# Patient Record
Sex: FEMALE | Race: WHITE | NOT HISPANIC OR LATINO | Employment: FULL TIME | URBAN - METROPOLITAN AREA
[De-identification: names, ages, dates, MRNs, and addresses within clinical notes are randomized per-mention and may not be internally consistent; named-entity substitution may affect disease eponyms.]

---

## 2017-01-03 ENCOUNTER — OFFICE VISIT (OUTPATIENT)
Dept: INTERNAL MEDICINE | Facility: CLINIC | Age: 52
End: 2017-01-03

## 2017-01-03 VITALS
TEMPERATURE: 98 F | OXYGEN SATURATION: 98 % | SYSTOLIC BLOOD PRESSURE: 122 MMHG | BODY MASS INDEX: 21 KG/M2 | WEIGHT: 130.7 LBS | HEIGHT: 66 IN | DIASTOLIC BLOOD PRESSURE: 60 MMHG | HEART RATE: 77 BPM

## 2017-01-03 DIAGNOSIS — E03.9 ACQUIRED HYPOTHYROIDISM: Primary | ICD-10-CM

## 2017-01-03 LAB
CHOLEST SERPL-MCNC: 220 MG/DL (ref 0–200)
HDLC SERPL-MCNC: 63 MG/DL (ref 40–60)
LDLC SERPL CALC-MCNC: 142 MG/DL (ref 0–100)
TRIGL SERPL-MCNC: 74 MG/DL (ref 0–150)
TSH SERPL DL<=0.005 MIU/L-ACNC: 0.63 MIU/ML (ref 0.27–4.2)
VLDLC SERPL CALC-MCNC: 14.8 MG/DL (ref 5–40)

## 2017-01-03 PROCEDURE — 99213 OFFICE O/P EST LOW 20 MIN: CPT | Performed by: FAMILY MEDICINE

## 2017-01-03 RX ORDER — LEVOTHYROXINE SODIUM 112 UG/1
112 TABLET ORAL DAILY
Qty: 30 TABLET | Refills: 11 | Status: SHIPPED | OUTPATIENT
Start: 2017-01-03 | End: 2017-10-12 | Stop reason: SDUPTHER

## 2017-01-03 RX ORDER — NAPROXEN SODIUM 220 MG
220 TABLET ORAL DAILY
COMMUNITY
End: 2019-02-06

## 2017-01-03 NOTE — MR AVS SNAPSHOT
Isacc LITO Aldridge   1/3/2017 8:30 AM   Office Visit    Dept Phone:  [phone]   Encounter #:  [id number]    Provider:  Theo Gong MD   Department:  [facility]                Your Full Care Plan              Today's Medication Changes          These changes are accurate as of: 1/3/17  8:46 AM.  If you have any questions, ask your nurse or doctor.               Stop taking medication(s)listed here:     ciprofloxacin 500 MG tablet   Commonly known as:  CIPRO   Stopped by:  Theo Gong MD           ibuprofen 200 MG tablet   Commonly known as:  ADVIL,MOTRIN   Stopped by:  Theo Gong MD                Where to Get Your Medications      These medications were sent to [address] - [address] [place] [address] AT  [address] - [phone]  - [phone] [place] [address]     Phone:  [phone]     levothyroxine 112 MCG tablet                  Your Updated Medication List          This list is accurate as of: 1/3/17  8:46 AM.  Always use your most recent med list.                levothyroxine 112 MCG tablet   Commonly known as:  SYNTHROID, LEVOTHROID   Take 1 tablet by mouth Daily.       naproxen sodium 220 MG tablet   Commonly known as:  ALEVE               We Performed the Following     Lipid Panel     TSH       You Were Diagnosed With        Codes Comments    Acquired hypothyroidism    -  Primary ICD-10-CM: E03.9  ICD-9-CM: 244.9       Instructions     None    Patient Instructions History      Upcoming Appointments     Visit Type Date Time Department    OFFICE VISIT 1/3/2017  8:30 AM [facility]      [employer] Signup     [facility] [employer] allows you to send messages to your doctor, view your test results, renew your prescriptions, schedule appointments, and more. To sign up, go to [employer] and click on the Sign Up Now link in the New User? box. Enter your  "[email or web address] Activation Code exactly as it appears below along with the last four digits of your Social Security Number and your Date of Birth () to complete the sign-up process. If you do not sign up before the expiration date, you must request a new code.    [email or web address] Activation Code: [email or web address]  Expires: 2017  8:46 AM    If you have questions, you can email [email or web address] or call [phone] to talk to our [email or web address] staff. Remember, [email or web address] is NOT to be used for urgent needs. For medical emergencies, dial 911.               Other Info from Your Visit           Allergies     No Known Allergies      Reason for Visit     follow up to hypothyroidism           Vital Signs     Blood Pressure Pulse Temperature Height Weight Oxygen Saturation    122/60 (BP Location: Left arm, Patient Position: Sitting, Cuff Size: Adult) 77 98 °F (36.7 °C) (Oral) 66\" (167.6 cm) 130 lb 11.2 oz (59.3 kg) 98%    Breastfeeding? Body Mass Index Smoking Status             No 21.1 kg/m2 Current Every Day Smoker         Problems and Diagnoses Noted     Acquired underactive thyroid        "

## 2017-01-03 NOTE — PROGRESS NOTES
Subjective   Isacc Aldridge is a 51 y.o. female.     Chief Complaint   Patient presents with   • follow up to hypothyroidism         History of Present Illness patient comes in for follow-up of hypothyroidism she's been under some stress this past year with death of her mother in [place] who had heroin overdose she tried to take some time off work but she cannot be home alone and so she went back to work and is gradually getting back into a daily routine no chest pain no shortness of breath no rapid heart rate or weight loss    The following portions of the patient's history were reviewed and updated as appropriate: allergies, current medications, past family history, past medical history, past social history, past surgical history and problem list.    Review of Systems   Constitutional: Negative.    Respiratory: Negative.    Cardiovascular: Negative.    Neurological: Negative.    Psychiatric/Behavioral: Negative.        Objective   Physical Exam   Constitutional: She appears well-developed and well-nourished.   Eyes: EOM are normal.   Neck: Normal range of motion. Neck supple. No thyromegaly present.   Cardiovascular: Normal rate and regular rhythm.    Pulmonary/Chest: Effort normal and breath sounds normal.   Neurological: She is alert.   Psychiatric: She has a normal mood and affect. Her behavior is normal. Judgment and thought content normal.   Nursing note and vitals reviewed.      Assessment/Plan   Isacc was seen today for follow up to hypothyroidism.    Diagnoses and all orders for this visit:    Acquired hypothyroidism  -     TSH  -     Lipid Panel    Other orders  -     levothyroxine (SYNTHROID, LEVOTHROID) 112 MCG tablet; Take 1 tablet by mouth Daily.      Follow-up results of blood work and as needed otherwise in 9 months

## 2017-01-05 ENCOUNTER — TELEPHONE (OUTPATIENT)
Dept: INTERNAL MEDICINE | Facility: CLINIC | Age: 52
End: 2017-01-05

## 2017-01-05 NOTE — TELEPHONE ENCOUNTER
----- Message from Theo Gong MD sent at 1/4/2017 12:50 PM EST -----  Labs good continue same dose of thyroid medicine recheck in 9 months

## 2017-10-10 ENCOUNTER — OFFICE VISIT (OUTPATIENT)
Dept: FAMILY MEDICINE CLINIC | Facility: CLINIC | Age: 52
End: 2017-10-10

## 2017-10-10 VITALS
DIASTOLIC BLOOD PRESSURE: 64 MMHG | OXYGEN SATURATION: 97 % | WEIGHT: 131.2 LBS | HEART RATE: 72 BPM | SYSTOLIC BLOOD PRESSURE: 106 MMHG | TEMPERATURE: 98 F | HEIGHT: 66 IN | BODY MASS INDEX: 21.08 KG/M2

## 2017-10-10 DIAGNOSIS — R31.9 HEMATURIA, UNSPECIFIED TYPE: ICD-10-CM

## 2017-10-10 DIAGNOSIS — E89.0 POSTABLATIVE HYPOTHYROIDISM: ICD-10-CM

## 2017-10-10 DIAGNOSIS — R59.0 CERVICAL LYMPHADENOPATHY: ICD-10-CM

## 2017-10-10 DIAGNOSIS — E03.9 ACQUIRED HYPOTHYROIDISM: Primary | ICD-10-CM

## 2017-10-10 LAB
BILIRUB BLD-MCNC: NEGATIVE MG/DL
CLARITY, POC: CLEAR
COLOR UR: YELLOW
GLUCOSE UR STRIP-MCNC: NEGATIVE MG/DL
KETONES UR QL: NEGATIVE
LEUKOCYTE EST, POC: NEGATIVE
NITRITE UR-MCNC: NEGATIVE MG/ML
PH UR: 6 [PH] (ref 5–8)
PROT UR STRIP-MCNC: NEGATIVE MG/DL
RBC # UR STRIP: ABNORMAL /UL
SP GR UR: 1.01 (ref 1–1.03)
UROBILINOGEN UR QL: NORMAL

## 2017-10-10 PROCEDURE — 99203 OFFICE O/P NEW LOW 30 MIN: CPT | Performed by: PHYSICIAN ASSISTANT

## 2017-10-10 RX ORDER — IBUPROFEN 200 MG
200 TABLET ORAL EVERY 6 HOURS PRN
COMMUNITY

## 2017-10-11 LAB
25(OH)D3+25(OH)D2 SERPL-MCNC: 30.6 NG/ML (ref 30–100)
ALBUMIN SERPL-MCNC: 4.8 G/DL (ref 3.5–5.2)
ALBUMIN/GLOB SERPL: 2 G/DL
ALP SERPL-CCNC: 65 U/L (ref 39–117)
ALT SERPL-CCNC: 13 U/L (ref 1–33)
AST SERPL-CCNC: 16 U/L (ref 1–32)
BASOPHILS # BLD AUTO: 0.02 10*3/MM3 (ref 0–0.2)
BASOPHILS NFR BLD AUTO: 0.4 % (ref 0–1.5)
BILIRUB SERPL-MCNC: 0.6 MG/DL (ref 0.1–1.2)
BUN SERPL-MCNC: 14 MG/DL (ref 6–20)
BUN/CREAT SERPL: 15.9 (ref 7–25)
CALCIUM SERPL-MCNC: 9.8 MG/DL (ref 8.6–10.5)
CHLORIDE SERPL-SCNC: 104 MMOL/L (ref 98–107)
CHOLEST SERPL-MCNC: 201 MG/DL (ref 0–200)
CO2 SERPL-SCNC: 25.5 MMOL/L (ref 22–29)
CREAT SERPL-MCNC: 0.88 MG/DL (ref 0.57–1)
EOSINOPHIL # BLD AUTO: 0.06 10*3/MM3 (ref 0–0.7)
EOSINOPHIL NFR BLD AUTO: 1.2 % (ref 0.3–6.2)
ERYTHROCYTE [DISTWIDTH] IN BLOOD BY AUTOMATED COUNT: 12.9 % (ref 11.7–13)
FT4I SERPL CALC-MCNC: 3.6 (ref 1.2–4.9)
GLOBULIN SER CALC-MCNC: 2.4 GM/DL
GLUCOSE SERPL-MCNC: 93 MG/DL (ref 65–99)
HCT VFR BLD AUTO: 43.2 % (ref 35.6–45.5)
HDLC SERPL-MCNC: 64 MG/DL (ref 40–60)
HGB BLD-MCNC: 14.9 G/DL (ref 11.9–15.5)
IMM GRANULOCYTES # BLD: 0 10*3/MM3 (ref 0–0.03)
IMM GRANULOCYTES NFR BLD: 0 % (ref 0–0.5)
LDLC SERPL CALC-MCNC: 124 MG/DL (ref 0–100)
LDLC/HDLC SERPL: 1.94 {RATIO}
LYMPHOCYTES # BLD AUTO: 1.46 10*3/MM3 (ref 0.9–4.8)
LYMPHOCYTES NFR BLD AUTO: 28.4 % (ref 19.6–45.3)
MCH RBC QN AUTO: 32.7 PG (ref 26.9–32)
MCHC RBC AUTO-ENTMCNC: 34.5 G/DL (ref 32.4–36.3)
MCV RBC AUTO: 94.9 FL (ref 80.5–98.2)
MONOCYTES # BLD AUTO: 0.32 10*3/MM3 (ref 0.2–1.2)
MONOCYTES NFR BLD AUTO: 6.2 % (ref 5–12)
NEUTROPHILS # BLD AUTO: 3.28 10*3/MM3 (ref 1.9–8.1)
NEUTROPHILS NFR BLD AUTO: 63.8 % (ref 42.7–76)
PLATELET # BLD AUTO: 136 10*3/MM3 (ref 140–500)
POTASSIUM SERPL-SCNC: 4.5 MMOL/L (ref 3.5–5.2)
PROT SERPL-MCNC: 7.2 G/DL (ref 6–8.5)
RBC # BLD AUTO: 4.55 10*6/MM3 (ref 3.9–5.2)
SODIUM SERPL-SCNC: 144 MMOL/L (ref 136–145)
T3RU NFR SERPL: 33 % (ref 24–39)
T4 SERPL-MCNC: 11 UG/DL (ref 4.5–12)
TRIGL SERPL-MCNC: 64 MG/DL (ref 0–150)
TSH SERPL DL<=0.005 MIU/L-ACNC: 0.56 UIU/ML (ref 0.45–4.5)
VLDLC SERPL CALC-MCNC: 12.8 MG/DL (ref 5–40)
WBC # BLD AUTO: 5.14 10*3/MM3 (ref 4.5–10.7)

## 2017-10-12 DIAGNOSIS — E89.0 POSTABLATIVE HYPOTHYROIDISM: Primary | ICD-10-CM

## 2017-10-12 LAB
APPEARANCE UR: CLEAR
BACTERIA #/AREA URNS HPF: ABNORMAL /HPF
BACTERIA UR CULT: NORMAL
BACTERIA UR CULT: NORMAL
BILIRUB UR QL STRIP: NEGATIVE
CASTS URNS MICRO: ABNORMAL
COLOR UR: YELLOW
EPI CELLS #/AREA URNS HPF: ABNORMAL /HPF
GLUCOSE UR QL: NEGATIVE
HGB UR QL STRIP: NEGATIVE
KETONES UR QL STRIP: NEGATIVE
LEUKOCYTE ESTERASE UR QL STRIP: NEGATIVE
NITRITE UR QL STRIP: NEGATIVE
PH UR STRIP: 6.5 [PH] (ref 5–8)
PROT UR QL STRIP: NEGATIVE
RBC #/AREA URNS HPF: ABNORMAL /HPF
SP GR UR: 1.02 (ref 1–1.03)
UROBILINOGEN UR STRIP-MCNC: (no result) MG/DL
WBC #/AREA URNS HPF: ABNORMAL /HPF

## 2017-10-12 RX ORDER — LEVOTHYROXINE SODIUM 0.1 MG/1
100 TABLET ORAL DAILY
Qty: 30 TABLET | Refills: 1 | Status: SHIPPED | OUTPATIENT
Start: 2017-10-12 | End: 2018-03-09 | Stop reason: SDUPTHER

## 2017-10-13 NOTE — PATIENT INSTRUCTIONS
52 YEAR OLD FEMALE WHO PRESENTS TODAY AS A NEW PATIENT. SHE HAS PMH SIGNIFICANT FOR HYPOTHYROIDISM THAT SOUNDS POST ABLATIVE- SHE REPORTS DOING SWALLOWS THEN THYROID BECAME UNDERACTIVE- HISTORY IS SLIGHTLY UNCLEAR. PATIENT DOES HAVE ANXIETY AND SOME PARANOIA BUT SHE ALSO HAS STRESS WITH SON THAT IS USING DRUGS THAT INCREASES ANXIETY DUE TO WITNESSING OVERDOSE. SHE DECLINES MEDICATION FOR THIS AT THIS TIME. I WILL CHECK LABS TODAY, INCLUDING THYROID. CALL IF NO RESULTS IN 1 WEEK. SHE ALSO REPORTS SWELLING UNDER THE ANGLE OF THE MANDIBLE ON THE RIGHT THAT WAS THERE SUDDENLY AND PAINLESS THEN RESOLVED SPONTANEOUSLY AFTER 5 DAYS. ON EXAM, SHE DOES HAVE RIGHT TONSILLAR LAD THAT APPEAR FIXED. I WILL CHECK US SOFT TISSUE NECK TO EVALUATE, ESPECIALLY WITH SMOKING HISTORY. SHE HAS HISTORY OF NIGHT SWEATS X 3 YEARS AND IS POSTMENOPAUSAL.     PATIENT IS OVERDUE FOR CPE WITH PAP. SHE DECLINES COLONOSCOPY BUT IS WILLING TO DO COLOGARD. SHE IS UP TO DATE WITH TDAP AND DECLINES FLU SHOT. I ASKED THAT SHE FOLLOW UP FOR CPE WITH PAP, EKG, PFT, AND REFERRAL FOR MAMMOGRAM, DEXA, AND COLOGARD. PATIENT VERBALIZED UNDERSTANDING AND AGREEMENT WITH PLAN.

## 2017-10-26 ENCOUNTER — HOSPITAL ENCOUNTER (OUTPATIENT)
Dept: ULTRASOUND IMAGING | Facility: HOSPITAL | Age: 52
Discharge: HOME | End: 2017-10-26
Admitting: PHYSICIAN ASSISTANT

## 2017-10-26 PROCEDURE — 76536 US EXAM OF HEAD AND NECK: CPT

## 2017-11-06 DIAGNOSIS — D35.1 PARATHYROID ADENOMA: Primary | ICD-10-CM

## 2017-11-07 ENCOUNTER — TELEPHONE (OUTPATIENT)
Dept: FAMILY MEDICINE CLINIC | Facility: CLINIC | Age: 52
End: 2017-11-07

## 2017-11-07 DIAGNOSIS — R31.9 HEMATURIA, UNSPECIFIED TYPE: Primary | ICD-10-CM

## 2017-11-07 NOTE — TELEPHONE ENCOUNTER
Patient states she never heard anything regarding the referral to Urology for blood in her urine, I do not see a referral in her chart

## 2018-03-09 DIAGNOSIS — E89.0 POSTABLATIVE HYPOTHYROIDISM: ICD-10-CM

## 2018-03-09 RX ORDER — LEVOTHYROXINE SODIUM 0.1 MG/1
TABLET ORAL
Qty: 30 TABLET | Refills: 0 | Status: SHIPPED | OUTPATIENT
Start: 2018-03-09 | End: 2018-05-01 | Stop reason: SDUPTHER

## 2018-05-01 DIAGNOSIS — E89.0 POSTABLATIVE HYPOTHYROIDISM: ICD-10-CM

## 2018-05-01 RX ORDER — LEVOTHYROXINE SODIUM 0.1 MG/1
TABLET ORAL
Qty: 30 TABLET | Refills: 0 | Status: SHIPPED | OUTPATIENT
Start: 2018-05-01 | End: 2018-05-16 | Stop reason: SDUPTHER

## 2018-05-01 NOTE — TELEPHONE ENCOUNTER
PATIENT IS OVERDUE FOR FOLLOW UP. WAS TO SCHEDULE FOLLOW UP FOR CPE AND NO SHOW APPT. THEN WAS TO FOLLOW UP IN 3-4 MONTHS FROM 10/2017- I HAVE NOT SEEN IN FOLLOW UP. OK TO REFILL FOR 1 MONTH ONCE SHE MAKES APPT FOR FOLLOW UP, FASTING.

## 2018-05-01 NOTE — TELEPHONE ENCOUNTER
----- Message from Lauryn Hwang sent at 5/1/2018  9:09 AM EDT -----  Regarding: medication thyroid  Patient has called her pharmacy for her Thyroid meds.  But she had problem getting last time and was only wanting to avoid that this time.

## 2018-05-02 ENCOUNTER — OFFICE VISIT (OUTPATIENT)
Dept: FAMILY MEDICINE CLINIC | Facility: CLINIC | Age: 53
End: 2018-05-02

## 2018-05-02 VITALS
TEMPERATURE: 98.3 F | DIASTOLIC BLOOD PRESSURE: 64 MMHG | OXYGEN SATURATION: 97 % | WEIGHT: 141.2 LBS | HEIGHT: 66 IN | SYSTOLIC BLOOD PRESSURE: 108 MMHG | HEART RATE: 67 BPM | BODY MASS INDEX: 22.69 KG/M2

## 2018-05-02 DIAGNOSIS — R59.0 CERVICAL LYMPHADENOPATHY: ICD-10-CM

## 2018-05-02 DIAGNOSIS — E89.0 POSTABLATIVE HYPOTHYROIDISM: ICD-10-CM

## 2018-05-02 DIAGNOSIS — Z87.42 HISTORY OF ABNORMAL CERVICAL PAP SMEAR: ICD-10-CM

## 2018-05-02 DIAGNOSIS — R31.9 HEMATURIA, UNSPECIFIED TYPE: ICD-10-CM

## 2018-05-02 DIAGNOSIS — Z01.419 WOMEN'S ANNUAL ROUTINE GYNECOLOGICAL EXAMINATION: Primary | ICD-10-CM

## 2018-05-02 DIAGNOSIS — D35.1 PARATHYROID ADENOMA: ICD-10-CM

## 2018-05-02 DIAGNOSIS — Z00.00 ROUTINE MEDICAL EXAM: ICD-10-CM

## 2018-05-02 PROCEDURE — 93000 ELECTROCARDIOGRAM COMPLETE: CPT | Performed by: PHYSICIAN ASSISTANT

## 2018-05-02 PROCEDURE — 99214 OFFICE O/P EST MOD 30 MIN: CPT | Performed by: PHYSICIAN ASSISTANT

## 2018-05-02 NOTE — PROGRESS NOTES
"Subjective   Isacc Aldridge is a 52 y.o. female. Patient seen today for FOLLOW UP OF HYPOTHYROIDISM, PARATHYROID ISSUES, HEMATURIA, CHOLESTEROL.     History of Present Illness     REPORTS SHE WAS SEEN BY ENT AFTER THYROID/PARATHYROID US. STATES SHE WAS ADVISED THEY NEEDED TO BIOPSY. SHE CANCELLED APPT AND DID NOT RESCHEDULE.     PATIENT STATES WAS \"DEALING WITH MY HANDS\"- STATES FINGERS \"GOT MESSED UP AT WORK\"- WENT TO OFFICE IN [place] AND [facility] IN [place]. REPORTS WENT THROUGH WORKMAN'S COMP. THEY ADVISED TO REMOVE BRACE AND RELEASED TO GO BACK TO WORK. STATES SHE WENT BACK AND THEN SHE QUIT HER JOB.     DID NOT GO UROLOGY AS REFERRED. NOT FASTING TODAY. DID NOT RETURN IN October OR November FOR LABS AS DIRECTED.     REPORTS DOES NOT WANT TO DO CPE WITH PAP TODAY.  STATES HAS HAD ABNORMAL PAP SMEARS \"ALL MY LIFE\". STATES HAD TO HAVE BIOPSIES.     The following portions of the patient's history were reviewed and updated as appropriate: allergies, current medications, past family history, past medical history, past social history, past surgical history and problem list.    Review of Systems   All other systems reviewed and are negative.      Objective   Physical Exam   Constitutional: She is oriented to person, place, and time. She appears well-developed and well-nourished.   HENT:   Head: Normocephalic and atraumatic.   Right Ear: External ear normal.   Left Ear: External ear normal.   Nose: Nose normal.   Eyes: Conjunctivae and lids are normal.   Neck: Neck supple. Carotid bruit is not present.   Cardiovascular: Normal rate, regular rhythm, normal heart sounds and intact distal pulses.  Exam reveals no gallop and no friction rub.    No murmur heard.  Pulmonary/Chest: Effort normal and breath sounds normal. No respiratory distress. She has no wheezes. She has no rhonchi. She has no rales.   Musculoskeletal: She exhibits no edema or deformity.   Neurological: She is alert and oriented to person, place, " and time. Gait normal.   Skin: Skin is warm and dry.   Psychiatric: She has a normal mood and affect. Her speech is normal and behavior is normal. Judgment and thought content normal. Cognition and memory are normal.   Nursing note and vitals reviewed.    ECG 12 Lead  Date/Time: 5/2/2018 5:24 PM  Performed by: AFIA VOGT  Authorized by: AFIA VOGT   Comparison: not compared with previous ECG   Previous ECG: no previous ECG available  Rhythm: sinus rhythm  Rate: normal  Conduction: non-specific intraventricular conduction delay  ST Elevation: V2  T Waves: T waves normal  QRS axis: normal  Other findings comments: BORDERLINE R WAVE PROGRESSION.   Clinical impression: non-specific ECG  Comments: INDICATION: ROUTINE TESTING          Assessment/Plan   Isacc was seen today for follow-up.    Diagnoses and all orders for this visit:    Women's annual routine gynecological examination  -     Ambulatory Referral to Gynecology    History of abnormal cervical Pap smear  -     Ambulatory Referral to Gynecology    Postablative hypothyroidism    Hematuria, unspecified type  -     Ambulatory Referral to Urology    Parathyroid adenoma  -     Ambulatory Referral to ENT (Otolaryngology)    Cervical lymphadenopathy  -     Ambulatory Referral to ENT (Otolaryngology)    Other orders  -     ECG 12 Lead      Patient Instructions   52 YEAR OLD FEMALE WHO INITIALLY WAS HERE TODAY FOR CPE WITH PAP, HOWEVER, SHE DID NO WANT TO PROCEED WITH THAT TODAY, REPORTING SHE WASN'T PREPARED FOR THAT TODAY. EKG AND PFT WERE PERFORMED TODAY. EKG BORDERLINE TODAY - NONSPECIFIC CHANGES. PFT WAS POOR EFFORT BUT SEEMS OK. PATIENT IS ALSO DUE FOR FOLLOW UP OF PARATHYROID ADENOMA, HEMATURIA, HYPOTHYROIDISM, AND LIPIDS. SHE WENT TO SEE ENT AND THEY WANTED TO BIOPSY PARATHYROID GLAND, PER PATIENT. SHE DID NTO SCHEDULE AND DID NOT RETURN FOR FOLLOW UP. SHE WILL NEED TO SEE ENT IN FOLLOW UP- I WILL REFER. SHE ALSO DID NOT SEE UROLOGY FOR HEMATURIA. I WILL  REFER AGAIN TODAY. SHE MUST GO TO RULE OUT STONES, MALIGNANCIES, AND OTHER ETIOLOGY OF HEMATURIA. PATIENT REPORTS TODAY THAT SHE HAS HAD ABNORMAL PAP SMEARS ALL HER LIFE AND HAD TO HAVE COLPOSCOPY/ BIOPSIES AT EACH PAP. I ADVISED WE SHOULD HAVE SPECIALIST TO COMPLETE PAP. I WILL REFER TODAY. SHE SHOULD FOLLOW UP HERE ASAP FOR FASTING LABS AND RETURN SCHEDULE BACK HERE FOR CPE.

## 2018-05-07 ENCOUNTER — OFFICE VISIT (OUTPATIENT)
Dept: FAMILY MEDICINE CLINIC | Facility: CLINIC | Age: 53
End: 2018-05-07

## 2018-05-07 VITALS
HEIGHT: 66 IN | WEIGHT: 141 LBS | HEART RATE: 85 BPM | SYSTOLIC BLOOD PRESSURE: 112 MMHG | DIASTOLIC BLOOD PRESSURE: 68 MMHG | BODY MASS INDEX: 22.66 KG/M2 | TEMPERATURE: 98.8 F | OXYGEN SATURATION: 97 %

## 2018-05-07 DIAGNOSIS — Z12.39 BREAST CANCER SCREENING: ICD-10-CM

## 2018-05-07 DIAGNOSIS — Z78.0 POSTMENOPAUSAL: ICD-10-CM

## 2018-05-07 DIAGNOSIS — Z12.11 COLON CANCER SCREENING: ICD-10-CM

## 2018-05-07 DIAGNOSIS — Z00.00 ROUTINE ADULT HEALTH MAINTENANCE: Primary | ICD-10-CM

## 2018-05-07 PROCEDURE — 99396 PREV VISIT EST AGE 40-64: CPT | Performed by: PHYSICIAN ASSISTANT

## 2018-05-07 NOTE — PATIENT INSTRUCTIONS
52 YEAR OLD FEMALE WHO PRESENTS TODAY FOR CPE. CPE COMPLETED TODAY. SHE IS UP TO DATE ON TDAP AND DECLINES PNEUMOCOCCAL VACCINES AND FLU VACCINE. SHE WAS REFERRED TO GYN FOR ANNUAL GYN EXAM AND PAP (AS SHE REPORTS PAP SMEARS HAVE ALWAYS BEEN ABNORMAL AND SHE HAS NEEDED SEVERAL COLPOSCOPIES / BIOPSIES). SHE DECLINES COLONOSCOPY BUT IS WILLING TO HAVE COLOGUARD. I WILL ORDER TODAY. PATIENT NEEDS UPDATED MAMMOGRAM AND DEXA. I WILL ORDER TODAY- SHE PREFERS [place].     PATIENT WAS REFERRED BACK TO ENT AND UROLOGY AT LAST APPT. SHE SHOULD KEEP APPTS WHEN SCHEDULED. SHE HAS CONTINUED HAND PAIN AND INABILITY TO CARRY THINGS OR SQUEEZE WITH HANDS. 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 SHE IS NO LONGER WORKING AT THAT JOB. I DISCUSSED AUTOIMMUNE TESTING VS FOLLOW UP WITH HAND SURGEON TODAY- SHE DECLINES REFERRAL OR TESTING BUT WILL LET ME KNOW IF SHE WOULD LIKE TO PROCEED AT ANY POINT.

## 2018-05-07 NOTE — PATIENT INSTRUCTIONS
52 YEAR OLD FEMALE WHO INITIALLY WAS HERE TODAY FOR CPE WITH PAP, HOWEVER, SHE DID NO WANT TO PROCEED WITH THAT TODAY, REPORTING SHE WASN'T PREPARED FOR THAT TODAY. EKG AND PFT WERE PERFORMED TODAY. EKG BORDERLINE TODAY - NONSPECIFIC CHANGES. PFT WAS POOR EFFORT BUT SEEMS OK. PATIENT IS ALSO DUE FOR FOLLOW UP OF PARATHYROID ADENOMA, HEMATURIA, HYPOTHYROIDISM, AND LIPIDS. SHE WENT TO SEE ENT AND THEY WANTED TO BIOPSY PARATHYROID GLAND, PER PATIENT. SHE DID NTO SCHEDULE AND DID NOT RETURN FOR FOLLOW UP. SHE WILL NEED TO SEE ENT IN FOLLOW UP- I WILL REFER. SHE ALSO DID NOT SEE UROLOGY FOR HEMATURIA. I WILL REFER AGAIN TODAY. SHE MUST GO TO RULE OUT STONES, MALIGNANCIES, AND OTHER ETIOLOGY OF HEMATURIA. PATIENT REPORTS TODAY THAT SHE HAS HAD ABNORMAL PAP SMEARS ALL HER LIFE AND HAD TO HAVE COLPOSCOPY/ BIOPSIES AT EACH PAP. I ADVISED WE SHOULD HAVE SPECIALIST TO COMPLETE PAP. I WILL REFER TODAY. SHE SHOULD FOLLOW UP HERE ASAP FOR FASTING LABS AND RETURN SCHEDULE BACK HERE FOR CPE.

## 2018-05-07 NOTE — PROGRESS NOTES
"Subjective   Isacc Aldridge is a 52 y.o. female. Patient seen today for annual exam    History of Present Illness     LAST COLONOSCOPY- PATIENT REFUSES. IS WILLING TO DO COLOGUARD. NO FHX COLON CANCER.   LAST MAMMOGRAM- WOULD LIKE TO GO TO [facility]- HAS PAPERWORK FROM 2015 BUT THINKS HAD MORE RECENTLY. STATES THINKS HAD IN 2017 BUT REPORTS THEY COULD NOT FIND THE REPORT.   LAST DEXA- HAS NOT HAD.   LAST PAP- UNSURE OF DATE BUT REPORTS HAS BEEN ABNORMAL REPETITIVELY WITH COLPOSCOPY/BIOPSY. REFERRED LAST WEEK TO GYN FOR ANNUAL EXAM. LMP 2 YEARS AGO.  LAST TDAP- 4/1/16  LAST PREVNAR- HAS NOT HAD- REFUSES  LAST PNEUMOVAX- HAS NOT HAD- REFUSES  LAST FLU SHOT- HAS NOT HAD- REFUSES.     SISTER WITH \"FEMALE CANCER\"- NOT SURE WHAT KIND. STATES RADIATION \"MELTED EVERYTHING TOGETHER\". STATES COLONOSCOPY WITH PERFORATION.     The following portions of the patient's history were reviewed and updated as appropriate: allergies, current medications, past family history, past medical history, past social history, past surgical history and problem list.    Review of Systems   Constitutional:        WEIGHT GAIN SINCE HAS NOT BEEN WORKING   Musculoskeletal: Positive for arthralgias.        BILATERAL HAND PAIN   All other systems reviewed and are negative.      Objective   Physical Exam   Constitutional: She is oriented to person, place, and time. She appears well-developed and well-nourished. No distress.   HENT:   Head: Normocephalic and atraumatic.   Right Ear: Hearing, tympanic membrane, external ear and ear canal normal.   Left Ear: Hearing, tympanic membrane, external ear and ear canal normal.   Nose: Nose normal.   Mouth/Throat: Oropharynx is clear and moist.   Eyes: Conjunctivae, EOM and lids are normal. Pupils are equal, round, and reactive to light.   Neck: Neck supple. No JVD present. Carotid bruit is not present. No tracheal deviation present. No thyroid mass and no thyromegaly present.   Cardiovascular: Normal " rate, regular rhythm, normal heart sounds and intact distal pulses.  Exam reveals no gallop and no friction rub.    No murmur heard.  Pulses:       Radial pulses are 2+ on the right side, and 2+ on the left side.        Posterior tibial pulses are 2+ on the right side, and 2+ on the left side.   Pulmonary/Chest: Effort normal and breath sounds normal. No respiratory distress. She has no wheezes. She has no rhonchi. She has no rales.   Abdominal: Soft. Normal aorta and bowel sounds are normal. She exhibits no distension and no abdominal bruit. There is no hepatosplenomegaly. There is no tenderness. There is no rigidity, no rebound and no guarding. No hernia.   Musculoskeletal: Normal range of motion. She exhibits no edema, tenderness or deformity.   Normal strength.   Lymphadenopathy:     She has no cervical adenopathy.   Neurological: She is alert and oriented to person, place, and time. She has normal strength and normal reflexes. She displays normal reflexes. No cranial nerve deficit or sensory deficit. She exhibits normal muscle tone. Coordination and gait normal.   Skin: Skin is warm and dry. No rash noted. She is not diaphoretic. No erythema.   Psychiatric: She has a normal mood and affect. Her speech is normal and behavior is normal. Judgment and thought content normal. Cognition and memory are normal.       Assessment/Plan   Isacc was seen today for annual exam.    Diagnoses and all orders for this visit:    Routine adult health maintenance    Breast cancer screening  -     Mammo Screening Bilateral With CAD    Postmenopausal  -     DEXA Bone Density Axial    Colon cancer screening  -     Cologuard - Stool, Per Rectum      Patient Instructions   52 YEAR OLD FEMALE WHO PRESENTS TODAY FOR CPE. CPE COMPLETED TODAY. SHE IS UP TO DATE ON TDAP AND DECLINES PNEUMOCOCCAL VACCINES AND FLU VACCINE. SHE WAS REFERRED TO GYN FOR ANNUAL GYN EXAM AND PAP (AS SHE REPORTS PAP SMEARS HAVE ALWAYS BEEN ABNORMAL AND SHE HAS  NEEDED SEVERAL COLPOSCOPIES / BIOPSIES). SHE DECLINES COLONOSCOPY BUT IS WILLING TO HAVE COLOGUARD. I WILL ORDER TODAY. PATIENT NEEDS UPDATED MAMMOGRAM AND DEXA. I WILL ORDER TODAY- SHE PREFERS [facility].     PATIENT WAS REFERRED BACK TO ENT AND UROLOGY AT LAST APPT. SHE SHOULD KEEP APPTS WHEN SCHEDULED. SHE HAS CONTINUED HAND PAIN AND INABILITY TO CARRY THINGS OR SQUEEZE WITH HANDS. 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 SHE IS NO LONGER WORKING AT THAT JOB. I DISCUSSED AUTOIMMUNE TESTING VS FOLLOW UP WITH HAND SURGEON TODAY- SHE DECLINES REFERRAL OR TESTING BUT WILL LET ME KNOW IF SHE WOULD LIKE TO PROCEED AT ANY POINT.

## 2018-05-08 LAB
25(OH)D3+25(OH)D2 SERPL-MCNC: 27.2 NG/ML (ref 30–100)
ALBUMIN SERPL-MCNC: 4.6 G/DL (ref 3.5–5.2)
ALBUMIN/GLOB SERPL: 1.8 G/DL
ALP SERPL-CCNC: 65 U/L (ref 39–117)
ALT SERPL-CCNC: 15 U/L (ref 1–33)
AST SERPL-CCNC: 19 U/L (ref 1–32)
BASOPHILS # BLD AUTO: 0.02 10*3/MM3 (ref 0–0.2)
BASOPHILS NFR BLD AUTO: 0.4 % (ref 0–1.5)
BILIRUB SERPL-MCNC: 0.4 MG/DL (ref 0.1–1.2)
BUN SERPL-MCNC: 12 MG/DL (ref 6–20)
BUN/CREAT SERPL: 12.5 (ref 7–25)
CALCIUM SERPL-MCNC: 9.5 MG/DL (ref 8.6–10.5)
CHLORIDE SERPL-SCNC: 103 MMOL/L (ref 98–107)
CHOLEST SERPL-MCNC: 230 MG/DL (ref 0–200)
CK SERPL-CCNC: 62 U/L (ref 20–180)
CO2 SERPL-SCNC: 25.9 MMOL/L (ref 22–29)
CREAT SERPL-MCNC: 0.96 MG/DL (ref 0.57–1)
EOSINOPHIL # BLD AUTO: 0.06 10*3/MM3 (ref 0–0.7)
EOSINOPHIL NFR BLD AUTO: 1.1 % (ref 0.3–6.2)
ERYTHROCYTE [DISTWIDTH] IN BLOOD BY AUTOMATED COUNT: 12.6 % (ref 11.7–13)
FOLATE SERPL-MCNC: 14.7 NG/ML (ref 4.78–24.2)
FT4I SERPL CALC-MCNC: 2.6 (ref 1.2–4.9)
GFR SERPLBLD CREATININE-BSD FMLA CKD-EPI: 62 ML/MIN/1.73
GFR SERPLBLD CREATININE-BSD FMLA CKD-EPI: 75 ML/MIN/1.73
GLOBULIN SER CALC-MCNC: 2.5 GM/DL
GLUCOSE SERPL-MCNC: 109 MG/DL (ref 65–99)
HCT VFR BLD AUTO: 42.6 % (ref 35.6–45.5)
HDLC SERPL-MCNC: 60 MG/DL (ref 40–60)
HGB BLD-MCNC: 14.8 G/DL (ref 11.9–15.5)
IMM GRANULOCYTES # BLD: 0.01 10*3/MM3 (ref 0–0.03)
IMM GRANULOCYTES NFR BLD: 0.2 % (ref 0–0.5)
LDLC SERPL CALC-MCNC: 152 MG/DL (ref 0–100)
LDLC/HDLC SERPL: 2.54 {RATIO}
LYMPHOCYTES # BLD AUTO: 1.64 10*3/MM3 (ref 0.9–4.8)
LYMPHOCYTES NFR BLD AUTO: 29.1 % (ref 19.6–45.3)
MCH RBC QN AUTO: 33.7 PG (ref 26.9–32)
MCHC RBC AUTO-ENTMCNC: 34.7 G/DL (ref 32.4–36.3)
MCV RBC AUTO: 97 FL (ref 80.5–98.2)
MONOCYTES # BLD AUTO: 0.28 10*3/MM3 (ref 0.2–1.2)
MONOCYTES NFR BLD AUTO: 5 % (ref 5–12)
NEUTROPHILS # BLD AUTO: 3.64 10*3/MM3 (ref 1.9–8.1)
NEUTROPHILS NFR BLD AUTO: 64.4 % (ref 42.7–76)
PLATELET # BLD AUTO: 166 10*3/MM3 (ref 140–500)
POTASSIUM SERPL-SCNC: 4.1 MMOL/L (ref 3.5–5.2)
PROT SERPL-MCNC: 7.1 G/DL (ref 6–8.5)
RBC # BLD AUTO: 4.39 10*6/MM3 (ref 3.9–5.2)
SODIUM SERPL-SCNC: 143 MMOL/L (ref 136–145)
T3RU NFR SERPL: 29 % (ref 24–39)
T4 SERPL-MCNC: 8.9 UG/DL (ref 4.5–12)
TRIGL SERPL-MCNC: 89 MG/DL (ref 0–150)
TSH SERPL DL<=0.005 MIU/L-ACNC: 6.29 UIU/ML (ref 0.45–4.5)
VIT B12 SERPL-MCNC: 401 PG/ML (ref 211–946)
VLDLC SERPL CALC-MCNC: 17.8 MG/DL (ref 5–40)
WBC # BLD AUTO: 5.64 10*3/MM3 (ref 4.5–10.7)

## 2018-05-16 DIAGNOSIS — E89.0 POSTABLATIVE HYPOTHYROIDISM: ICD-10-CM

## 2018-05-16 RX ORDER — LEVOTHYROXINE SODIUM 112 UG/1
112 TABLET ORAL DAILY
Qty: 30 TABLET | Refills: 1 | Status: SHIPPED | OUTPATIENT
Start: 2018-05-16 | End: 2018-08-03 | Stop reason: SDUPTHER

## 2018-05-16 RX ORDER — CHOLECALCIFEROL (VITAMIN D3) 125 MCG
2000 CAPSULE ORAL DAILY
Qty: 30 TABLET | Refills: 3 | Status: SHIPPED | OUTPATIENT
Start: 2018-05-16 | End: 2020-09-18

## 2018-08-03 DIAGNOSIS — E89.0 POSTABLATIVE HYPOTHYROIDISM: ICD-10-CM

## 2018-08-03 RX ORDER — LEVOTHYROXINE SODIUM 112 UG/1
TABLET ORAL
Qty: 30 TABLET | Refills: 0 | Status: SHIPPED | OUTPATIENT
Start: 2018-08-03 | End: 2018-09-10 | Stop reason: SDUPTHER

## 2018-09-10 ENCOUNTER — OFFICE VISIT (OUTPATIENT)
Dept: FAMILY MEDICINE CLINIC | Facility: CLINIC | Age: 53
End: 2018-09-10

## 2018-09-10 VITALS
BODY MASS INDEX: 23.24 KG/M2 | TEMPERATURE: 98.2 F | DIASTOLIC BLOOD PRESSURE: 70 MMHG | WEIGHT: 144.6 LBS | HEIGHT: 66 IN | HEART RATE: 81 BPM | SYSTOLIC BLOOD PRESSURE: 106 MMHG | OXYGEN SATURATION: 96 %

## 2018-09-10 DIAGNOSIS — E55.9 VITAMIN D DEFICIENCY: ICD-10-CM

## 2018-09-10 DIAGNOSIS — E89.0 POSTABLATIVE HYPOTHYROIDISM: ICD-10-CM

## 2018-09-10 DIAGNOSIS — E78.49 OTHER HYPERLIPIDEMIA: ICD-10-CM

## 2018-09-10 DIAGNOSIS — R31.9 HEMATURIA, UNSPECIFIED TYPE: ICD-10-CM

## 2018-09-10 DIAGNOSIS — D35.1 PARATHYROID ADENOMA: ICD-10-CM

## 2018-09-10 DIAGNOSIS — E89.0 POSTABLATIVE HYPOTHYROIDISM: Primary | ICD-10-CM

## 2018-09-10 LAB
BILIRUB BLD-MCNC: NEGATIVE MG/DL
CLARITY, POC: CLEAR
COLOR UR: YELLOW
GLUCOSE UR STRIP-MCNC: NEGATIVE MG/DL
KETONES UR QL: NEGATIVE
LEUKOCYTE EST, POC: ABNORMAL
NITRITE UR-MCNC: NEGATIVE MG/ML
PH UR: 6 [PH] (ref 5–8)
PROT UR STRIP-MCNC: NEGATIVE MG/DL
RBC # UR STRIP: ABNORMAL /UL
SP GR UR: 1 (ref 1–1.03)
UROBILINOGEN UR QL: NORMAL

## 2018-09-10 PROCEDURE — 99214 OFFICE O/P EST MOD 30 MIN: CPT | Performed by: PHYSICIAN ASSISTANT

## 2018-09-10 RX ORDER — LEVOTHYROXINE SODIUM 112 UG/1
TABLET ORAL
Qty: 30 TABLET | Refills: 0 | Status: SHIPPED | OUTPATIENT
Start: 2018-09-10 | End: 2018-10-16 | Stop reason: SDUPTHER

## 2018-09-10 NOTE — PROGRESS NOTES
Subjective   Isacc Aldridge is a 53 y.o. female. Patient seen today for follow-up hypothyroid, Vitamin D deficiency     History of Present Illness     HAS BEEN DOING WELL WITHOUT COMPLAINTS. NO AE TO MEDICATIONS.     SHE DID NOT WANT TO HAVE PARATHYROID BIOPSY FOR PROBABLE PARATHYROID ADENOMA. WAS SEEN BY ENT, DR GUZMAN, WHO RECOMMENDS BIOPSY.     HAS NOT HAD MAMMOGRAM OR DEXA AS REFERRED 5/2018. \    The following portions of the patient's history were reviewed and updated as appropriate: allergies, current medications, past family history, past medical history, past social history, past surgical history and problem list.    Review of Systems   All other systems reviewed and are negative.      Objective   Physical Exam   Constitutional: She is oriented to person, place, and time. She appears well-developed and well-nourished.   HENT:   Head: Normocephalic and atraumatic.   Right Ear: External ear normal.   Left Ear: External ear normal.   Nose: Nose normal.   Eyes: Conjunctivae and lids are normal.   Neck: Neck supple. Carotid bruit is not present.   Cardiovascular: Normal rate, regular rhythm, normal heart sounds and intact distal pulses.  Exam reveals no gallop and no friction rub.    No murmur heard.  Pulmonary/Chest: Effort normal and breath sounds normal. No respiratory distress. She has no wheezes. She has no rhonchi. She has no rales.   Musculoskeletal: She exhibits no edema or deformity.   Neurological: She is alert and oriented to person, place, and time. Gait normal.   Skin: Skin is warm and dry.   Psychiatric: She has a normal mood and affect. Her speech is normal and behavior is normal. Judgment and thought content normal. Cognition and memory are normal.   Nursing note and vitals reviewed.      Assessment/Plan   Isacc was seen today for follow-up.    Diagnoses and all orders for this visit:    Postablative hypothyroidism  -     CBC & Differential  -     Comprehensive Metabolic Panel  -     CK  -      Lipid Panel With LDL / HDL Ratio  -     Thyroid Panel With TSH  -     PTH, Intact  -     Vitamin D 25 Hydroxy  -     POC Urinalysis Dipstick, Automated    Parathyroid adenoma  -     CBC & Differential  -     Comprehensive Metabolic Panel  -     CK  -     Lipid Panel With LDL / HDL Ratio  -     Thyroid Panel With TSH  -     PTH, Intact  -     Vitamin D 25 Hydroxy  -     POC Urinalysis Dipstick, Automated    Vitamin D deficiency  -     Vitamin D 25 Hydroxy    Other hyperlipidemia  -     Lipid Panel With LDL / HDL Ratio    Hematuria, unspecified type  -     Urinalysis With Microscopic - Urine, Clean Catch  -     Urine Culture - Urine, Urine, Clean Catch    Other orders  -     Microscopic Examination      Patient Instructions   52 YEAR OLD FEMALE WHO PRESENTS TODAY FOR FOLLOW UP OF PARATHYROID ADENOMA, HEMATURIA, HYPOTHYROIDISM, AND LIPIDS. SHE IS UP TO DATE ON TDAP AND DECLINES PNEUMOCOCCAL VACCINES AND FLU VACCINE. SHE WAS REFERRED TO GYN FOR ANNUAL GYN EXAM AND PAP (AS SHE REPORTS PAP SMEARS HAVE ALWAYS BEEN ABNORMAL AND SHE HAS NEEDED SEVERAL COLPOSCOPIES / BIOPSIES). SHE HAS APPT 10/4/18. SHE COMPLETED COLOGUARD THAT WAS NEGATIVE. PATIENT NEEDS UPDATED MAMMOGRAM AND DEXA.SHE WAS REFERRED IN MAY 2018.      PATIENT WAS REFERRED BACK TO ENT AND UROLOGY. SHE HAS CONTINUED HAND PAIN AND INABILITY TO CARRY THINGS OR SQUEEZE WITH HANDS. 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 SHE IS NO LONGER WORKING AT THAT JOB. I DISCUSSED AUTOIMMUNE TESTING VS FOLLOW UP WITH HAND SURGEON TODAY- SHE DECLINES REFERRAL OR TESTING BUT WILL LET ME KNOW IF SHE WOULD LIKE TO PROCEED AT ANY POINT. SHE WAS ALSO SEEN BY ENT WHO RECOMMEND PARATHYROID MASS BIOPSY AND SHE DECLINES AND HAS NOT RETURNED FOR FOLLOW UP. I ENCOURAGED HER TO RETURN TO ENT FOR FOLLOW UP.

## 2018-09-11 LAB
25(OH)D3+25(OH)D2 SERPL-MCNC: 24.2 NG/ML (ref 30–100)
ALBUMIN SERPL-MCNC: 4.6 G/DL (ref 3.5–5.5)
ALBUMIN/GLOB SERPL: 2.2 {RATIO} (ref 1.2–2.2)
ALP SERPL-CCNC: 65 IU/L (ref 39–117)
ALT SERPL-CCNC: 18 IU/L (ref 0–32)
AST SERPL-CCNC: 14 IU/L (ref 0–40)
BASOPHILS # BLD AUTO: 0 X10E3/UL (ref 0–0.2)
BASOPHILS NFR BLD AUTO: 0 %
BILIRUB SERPL-MCNC: 0.6 MG/DL (ref 0–1.2)
BUN SERPL-MCNC: 14 MG/DL (ref 6–24)
BUN/CREAT SERPL: 18 (ref 9–23)
CALCIUM SERPL-MCNC: 9.5 MG/DL (ref 8.7–10.2)
CHLORIDE SERPL-SCNC: 103 MMOL/L (ref 96–106)
CHOLEST SERPL-MCNC: 232 MG/DL (ref 100–199)
CK SERPL-CCNC: 51 U/L (ref 24–173)
CO2 SERPL-SCNC: 25 MMOL/L (ref 20–29)
CREAT SERPL-MCNC: 0.79 MG/DL (ref 0.57–1)
EOSINOPHIL # BLD AUTO: 0.1 X10E3/UL (ref 0–0.4)
EOSINOPHIL NFR BLD AUTO: 1 %
ERYTHROCYTE [DISTWIDTH] IN BLOOD BY AUTOMATED COUNT: 13.1 % (ref 12.3–15.4)
FT4I SERPL CALC-MCNC: 2.5 (ref 1.2–4.9)
GLOBULIN SER CALC-MCNC: 2.1 G/DL (ref 1.5–4.5)
GLUCOSE SERPL-MCNC: 84 MG/DL (ref 65–99)
HCT VFR BLD AUTO: 41.4 % (ref 34–46.6)
HDLC SERPL-MCNC: 53 MG/DL
HGB BLD-MCNC: 14.8 G/DL (ref 11.1–15.9)
IMM GRANULOCYTES # BLD: 0 X10E3/UL (ref 0–0.1)
IMM GRANULOCYTES NFR BLD: 0 %
LDLC SERPL CALC-MCNC: 155 MG/DL (ref 0–99)
LDLC/HDLC SERPL: 2.9 RATIO (ref 0–3.2)
LYMPHOCYTES # BLD AUTO: 1.6 X10E3/UL (ref 0.7–3.1)
LYMPHOCYTES NFR BLD AUTO: 26 %
MCH RBC QN AUTO: 32 PG (ref 26.6–33)
MCHC RBC AUTO-ENTMCNC: 35.7 G/DL (ref 31.5–35.7)
MCV RBC AUTO: 90 FL (ref 79–97)
MONOCYTES # BLD AUTO: 0.3 X10E3/UL (ref 0.1–0.9)
MONOCYTES NFR BLD AUTO: 5 %
NEUTROPHILS # BLD AUTO: 4 X10E3/UL (ref 1.4–7)
NEUTROPHILS NFR BLD AUTO: 68 %
PLATELET # BLD AUTO: 156 X10E3/UL (ref 150–379)
POTASSIUM SERPL-SCNC: 4.3 MMOL/L (ref 3.5–5.2)
PROT SERPL-MCNC: 6.7 G/DL (ref 6–8.5)
PTH-INTACT SERPL-MCNC: 28 PG/ML (ref 15–65)
RBC # BLD AUTO: 4.62 X10E6/UL (ref 3.77–5.28)
SODIUM SERPL-SCNC: 142 MMOL/L (ref 134–144)
T3RU NFR SERPL: 27 % (ref 24–39)
T4 SERPL-MCNC: 9.3 UG/DL (ref 4.5–12)
TRIGL SERPL-MCNC: 122 MG/DL (ref 0–149)
TSH SERPL DL<=0.005 MIU/L-ACNC: 1.29 UIU/ML (ref 0.45–4.5)
VLDLC SERPL CALC-MCNC: 24 MG/DL (ref 5–40)
WBC # BLD AUTO: 5.9 X10E3/UL (ref 3.4–10.8)

## 2018-09-12 LAB
APPEARANCE UR: CLEAR
BACTERIA #/AREA URNS HPF: NORMAL /[HPF]
BACTERIA UR CULT: NORMAL
BACTERIA UR CULT: NORMAL
BILIRUB UR QL STRIP: NEGATIVE
COLOR UR: YELLOW
EPI CELLS #/AREA URNS HPF: NORMAL /HPF
GLUCOSE UR QL: NEGATIVE
HGB UR QL STRIP: NEGATIVE
KETONES UR QL STRIP: NEGATIVE
LEUKOCYTE ESTERASE UR QL STRIP: (no result)
MICRO URNS: (no result)
NITRITE UR QL STRIP: NEGATIVE
PH UR STRIP: 7 [PH] (ref 5–7.5)
PROT UR QL STRIP: NEGATIVE
RBC #/AREA URNS HPF: NORMAL /HPF
SP GR UR: 1.01 (ref 1–1.03)
UROBILINOGEN UR STRIP-MCNC: 0.2 MG/DL (ref 0.2–1)
WBC #/AREA URNS HPF: NORMAL /HPF

## 2018-09-16 RX ORDER — ATORVASTATIN CALCIUM 10 MG/1
10 TABLET, FILM COATED ORAL DAILY
Qty: 30 TABLET | Refills: 0 | Status: SHIPPED | OUTPATIENT
Start: 2018-09-16 | End: 2019-11-11

## 2018-09-21 ENCOUNTER — TELEPHONE (OUTPATIENT)
Dept: FAMILY MEDICINE CLINIC | Facility: CLINIC | Age: 53
End: 2018-09-21

## 2018-09-21 NOTE — TELEPHONE ENCOUNTER
Patient called someone had left her a message to call the office, her best call back is [phone]

## 2018-09-21 NOTE — PATIENT INSTRUCTIONS
52 YEAR OLD FEMALE WHO PRESENTS TODAY FOR FOLLOW UP OF PARATHYROID ADENOMA, HEMATURIA, HYPOTHYROIDISM, AND LIPIDS. SHE IS UP TO DATE ON TDAP AND DECLINES PNEUMOCOCCAL VACCINES AND FLU VACCINE. SHE WAS REFERRED TO GYN FOR ANNUAL GYN EXAM AND PAP (AS SHE REPORTS PAP SMEARS HAVE ALWAYS BEEN ABNORMAL AND SHE HAS NEEDED SEVERAL COLPOSCOPIES / BIOPSIES). SHE HAS APPT 10/4/18. SHE COMPLETED COLOGUARD THAT WAS NEGATIVE. PATIENT NEEDS UPDATED MAMMOGRAM AND DEXA.SHE WAS REFERRED IN MAY 2018.      PATIENT WAS REFERRED BACK TO ENT AND UROLOGY. SHE HAS CONTINUED HAND PAIN AND INABILITY TO CARRY THINGS OR SQUEEZE WITH HANDS. 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 SHE IS NO LONGER WORKING AT THAT JOB. I DISCUSSED AUTOIMMUNE TESTING VS FOLLOW UP WITH HAND SURGEON TODAY- SHE DECLINES REFERRAL OR TESTING BUT WILL LET ME KNOW IF SHE WOULD LIKE TO PROCEED AT ANY POINT. SHE WAS ALSO SEEN BY ENT WHO RECOMMEND PARATHYROID MASS BIOPSY AND SHE DECLINES AND HAS NOT RETURNED FOR FOLLOW UP. I ENCOURAGED HER TO RETURN TO ENT FOR FOLLOW UP.

## 2018-10-16 DIAGNOSIS — E89.0 POSTABLATIVE HYPOTHYROIDISM: ICD-10-CM

## 2018-10-16 RX ORDER — LEVOTHYROXINE SODIUM 112 UG/1
TABLET ORAL
Qty: 30 TABLET | Refills: 2 | Status: SHIPPED | OUTPATIENT
Start: 2018-10-16 | End: 2019-01-23 | Stop reason: SDUPTHER

## 2018-12-22 NOTE — PROGRESS NOTES
"Subjective   Isacc Aldridge is a 52 y.o. female seen today to establish care - HYPOTHYROIDISM    History of Present Illness     PREVIOUS PCP DR RIGGINS. LAST SEEN 1/2017- CHECKUP FOR HYPOTHYROIDISM. NOT SURE WHEN DX HYPOTHYROIDISM. NO OTHER CHRONIC MEDICAL PROBLEMS.     RIGHT WRIST- 6/2017- \"MY FINGERS CRIPPLED UP\" WITH ARTHRITIS. 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 SEEING WORKMAN'S COMPENSATION PROVIDER AND IS HAVING PHYSICAL THERAPY.     HAS A KNOW ON RIGHT SIDE OF NECK WITH STIFFNESS. STATES WOKE UP ONE MORNING AND IT WAS THERE AND WOKE UP 5 DAYS LATER THEN RESOLVED. WAS NOT SICK, NO SORE THROAT. NO SYMPTOMS OTHER THAN LN SWELLING.     NIGHT SWEATS > 3 YEARS AGO- HAS HOT FLASHES AND NIGHT SWEATS. WORKS AND SLEEPS A LOT AND HAS TROUBLE REMEMBERING. 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   NO OTHER COMPLAINTS. NO COUGH, WEIGHT LOSS, COUGHING UP BLOOD.     LAST MAMMOGRAM- [facility]. THINKS HAS BEEN ABOUT 1 YEAR.   LAST GYN EXAM- < 5 YEARS. [facility]. HAD SEVERAL ABNORMAL PAP- HAD BIOPSY YEARS AGO.   NEVER HAD COLONOSCOPY. DECLINES COLONOSCOPY- IS WILLING TO HAVE COLOGARD.   LAST TDAP- 2016 AT [facility]. WAS WALKING THROUGH THE HOUSE AND WENT THROUGH FOOT.   DOES NOT TAKE FLU SHOTS. DECLINES FLU SHOT.     The following portions of the patient's history were reviewed and updated as appropriate: allergies, current medications, past family history, past medical history, past social history, past surgical history and problem list.    Review of Systems   Psychiatric/Behavioral: The patient is nervous/anxious.    All other systems reviewed and are negative.      Objective   Physical Exam   Constitutional: She is oriented to person, place, and time. She appears well-developed and well-nourished.   HENT:   Head: Normocephalic and atraumatic.   Right Ear: External ear normal.   Left Ear: External ear normal.   Nose: Nose normal.   Eyes: Conjunctivae and lids are normal.   Neck: Neck " supple. Carotid bruit is not present.   Cardiovascular: Normal rate, regular rhythm, normal heart sounds and intact distal pulses.  Exam reveals no gallop and no friction rub.    No murmur heard.  Pulmonary/Chest: Effort normal and breath sounds normal. No respiratory distress. She has no wheezes. She has no rhonchi. She has no rales.   Musculoskeletal: She exhibits no edema or deformity.   Lymphadenopathy:        Head (right side): Tonsillar (FIXED TONSILLAR LAD- MILD TTP ONLY WITH DEEP PALPATION) adenopathy present.   Neurological: She is alert and oriented to person, place, and time. Gait normal.   Skin: Skin is warm and dry.   Psychiatric: She has a normal mood and affect. Her speech is normal and behavior is normal. Judgment and thought content normal. Cognition and memory are normal.   Nursing note and vitals reviewed.      Assessment/Plan   Isacc was seen today for to establish care.    Diagnoses and all orders for this visit:    Acquired hypothyroidism  -     CBC & Differential  -     Comprehensive Metabolic Panel  -     Lipid Panel With LDL / HDL Ratio  -     Thyroid Panel With TSH  -     Vitamin D 25 Hydroxy  -     POC Urinalysis Dipstick, Automated    Cervical lymphadenopathy  -     CBC & Differential  -     Comprehensive Metabolic Panel  -     Lipid Panel With LDL / HDL Ratio  -     Thyroid Panel With TSH  -     Vitamin D 25 Hydroxy  -     POC Urinalysis Dipstick, Automated  -     US Head Neck Soft Tissue    Postablative hypothyroidism    Hematuria, unspecified type  -     Urinalysis With Microscopic - Urine, Clean Catch  -     Urine Culture - Urine, Urine, Clean Catch      Patient Instructions   52 YEAR OLD FEMALE WHO PRESENTS TODAY AS A NEW PATIENT. SHE HAS PMH SIGNIFICANT FOR HYPOTHYROIDISM THAT SOUNDS POST ABLATIVE- SHE REPORTS DOING SWALLOWS THEN THYROID BECAME UNDERACTIVE- HISTORY IS SLIGHTLY UNCLEAR. PATIENT DOES HAVE ANXIETY AND SOME PARANOIA BUT SHE ALSO HAS STRESS WITH SON THAT IS USING DRUGS  3 THAT INCREASES ANXIETY DUE TO WITNESSING OVERDOSE. SHE DECLINES MEDICATION FOR THIS AT THIS TIME. I WILL CHECK LABS TODAY, INCLUDING THYROID. CALL IF NO RESULTS IN 1 WEEK. SHE ALSO REPORTS SWELLING UNDER THE ANGLE OF THE MANDIBLE ON THE RIGHT THAT WAS THERE SUDDENLY AND PAINLESS THEN RESOLVED SPONTANEOUSLY AFTER 5 DAYS. ON EXAM, SHE DOES HAVE RIGHT TONSILLAR LAD THAT APPEAR FIXED. I WILL CHECK US SOFT TISSUE NECK TO EVALUATE, ESPECIALLY WITH SMOKING HISTORY. SHE HAS HISTORY OF NIGHT SWEATS X 3 YEARS AND IS POSTMENOPAUSAL.     PATIENT IS OVERDUE FOR CPE WITH PAP. SHE DECLINES COLONOSCOPY BUT IS WILLING TO DO COLOGARD. SHE IS UP TO DATE WITH TDAP AND DECLINES FLU SHOT. I ASKED THAT SHE FOLLOW UP FOR CPE WITH PAP, EKG, PFT, AND REFERRAL FOR MAMMOGRAM, DEXA, AND COLOGARD. PATIENT VERBALIZED UNDERSTANDING AND AGREEMENT WITH PLAN.

## 2019-01-23 DIAGNOSIS — E89.0 POSTABLATIVE HYPOTHYROIDISM: ICD-10-CM

## 2019-01-24 RX ORDER — LEVOTHYROXINE SODIUM 112 UG/1
TABLET ORAL
Qty: 30 TABLET | Refills: 0 | Status: SHIPPED | OUTPATIENT
Start: 2019-01-24 | End: 2019-02-27 | Stop reason: SDUPTHER

## 2019-02-06 ENCOUNTER — OFFICE VISIT (OUTPATIENT)
Dept: FAMILY MEDICINE CLINIC | Facility: CLINIC | Age: 54
End: 2019-02-06

## 2019-02-06 VITALS
WEIGHT: 151.4 LBS | HEART RATE: 92 BPM | OXYGEN SATURATION: 98 % | SYSTOLIC BLOOD PRESSURE: 104 MMHG | HEIGHT: 66 IN | BODY MASS INDEX: 24.33 KG/M2 | TEMPERATURE: 98.2 F | DIASTOLIC BLOOD PRESSURE: 68 MMHG

## 2019-02-06 DIAGNOSIS — D35.1 PARATHYROID ADENOMA: ICD-10-CM

## 2019-02-06 DIAGNOSIS — E89.0 POSTABLATIVE HYPOTHYROIDISM: Primary | ICD-10-CM

## 2019-02-06 DIAGNOSIS — E55.9 VITAMIN D DEFICIENCY: ICD-10-CM

## 2019-02-06 DIAGNOSIS — E78.49 OTHER HYPERLIPIDEMIA: ICD-10-CM

## 2019-02-06 PROCEDURE — 99214 OFFICE O/P EST MOD 30 MIN: CPT | Performed by: PHYSICIAN ASSISTANT
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
## 2019-02-07 LAB
25(OH)D3+25(OH)D2 SERPL-MCNC: 23.3 NG/ML (ref 30–100)
ALBUMIN SERPL-MCNC: 4.4 G/DL (ref 3.5–5.2)
ALBUMIN/GLOB SERPL: 1.8 G/DL
ALP SERPL-CCNC: 63 U/L (ref 39–117)
ALT SERPL-CCNC: 20 U/L (ref 1–33)
AST SERPL-CCNC: 15 U/L (ref 1–32)
BASOPHILS # BLD AUTO: 0.01 10*3/MM3 (ref 0–0.2)
BASOPHILS NFR BLD AUTO: 0.2 % (ref 0–1.5)
BILIRUB SERPL-MCNC: 0.4 MG/DL (ref 0.1–1.2)
BUN SERPL-MCNC: 13 MG/DL (ref 6–20)
BUN/CREAT SERPL: 16.7 (ref 7–25)
CALCIUM SERPL-MCNC: 9.6 MG/DL (ref 8.6–10.5)
CHLORIDE SERPL-SCNC: 104 MMOL/L (ref 98–107)
CHOLEST SERPL-MCNC: 235 MG/DL (ref 0–200)
CK SERPL-CCNC: 64 U/L (ref 20–180)
CO2 SERPL-SCNC: 25.4 MMOL/L (ref 22–29)
CREAT SERPL-MCNC: 0.78 MG/DL (ref 0.57–1)
EOSINOPHIL # BLD AUTO: 0.03 10*3/MM3 (ref 0–0.7)
EOSINOPHIL NFR BLD AUTO: 0.5 % (ref 0.3–6.2)
ERYTHROCYTE [DISTWIDTH] IN BLOOD BY AUTOMATED COUNT: 12.7 % (ref 11.7–13)
FT4I SERPL CALC-MCNC: 3.6 (ref 1.2–4.9)
GLOBULIN SER CALC-MCNC: 2.4 GM/DL
GLUCOSE SERPL-MCNC: 99 MG/DL (ref 65–99)
HCT VFR BLD AUTO: 42.3 % (ref 35.6–45.5)
HDLC SERPL-MCNC: 56 MG/DL (ref 40–60)
HGB BLD-MCNC: 14.6 G/DL (ref 11.9–15.5)
IMM GRANULOCYTES # BLD AUTO: 0.01 10*3/MM3 (ref 0–0.03)
IMM GRANULOCYTES NFR BLD AUTO: 0.2 % (ref 0–0.5)
LDLC SERPL CALC-MCNC: 162 MG/DL (ref 0–100)
LDLC/HDLC SERPL: 2.9 {RATIO}
LYMPHOCYTES # BLD AUTO: 1.51 10*3/MM3 (ref 0.9–4.8)
LYMPHOCYTES NFR BLD AUTO: 24.5 % (ref 19.6–45.3)
MCH RBC QN AUTO: 32.4 PG (ref 26.9–32)
MCHC RBC AUTO-ENTMCNC: 34.5 G/DL (ref 32.4–36.3)
MCV RBC AUTO: 94 FL (ref 80.5–98.2)
MONOCYTES # BLD AUTO: 0.33 10*3/MM3 (ref 0.2–1.2)
MONOCYTES NFR BLD AUTO: 5.4 % (ref 5–12)
NEUTROPHILS # BLD AUTO: 4.28 10*3/MM3 (ref 1.9–8.1)
NEUTROPHILS NFR BLD AUTO: 69.4 % (ref 42.7–76)
PLATELET # BLD AUTO: 158 10*3/MM3 (ref 140–500)
POTASSIUM SERPL-SCNC: 4.4 MMOL/L (ref 3.5–5.2)
PROT SERPL-MCNC: 6.8 G/DL (ref 6–8.5)
RBC # BLD AUTO: 4.5 10*6/MM3 (ref 3.9–5.2)
SODIUM SERPL-SCNC: 143 MMOL/L (ref 136–145)
T3RU NFR SERPL: 32 % (ref 24–39)
T4 SERPL-MCNC: 11.2 UG/DL (ref 4.5–12)
TRIGL SERPL-MCNC: 84 MG/DL (ref 0–150)
TSH SERPL DL<=0.005 MIU/L-ACNC: 0.63 UIU/ML (ref 0.45–4.5)
VLDLC SERPL CALC-MCNC: 16.8 MG/DL (ref 5–40)
WBC # BLD AUTO: 6.16 10*3/MM3 (ref 4.5–10.7)

## 2019-02-10 NOTE — PATIENT INSTRUCTIONS
53 YEAR OLD FEMALE WHO PRESENTS TODAY FOR FOLLOW UP OF PARATHYROID ADENOMA, HEMATURIA, HYPOTHYROIDISM, AND LIPIDS. SHE IS UP TO DATE ON TDAP AND DECLINES PNEUMOCOCCAL VACCINES AND FLU VACCINE. SHE WAS REFERRED TO GYN FOR ANNUAL GYN EXAM AND PAP (AS SHE REPORTS PAP SMEARS HAVE ALWAYS BEEN ABNORMAL AND SHE HAS NEEDED SEVERAL COLPOSCOPIES / BIOPSIES). SHE HAD APPT 10/4/18 AND CANCELLED APPT. SHE NEEDS TO RESCHEDULE GYN REFERRAL. SHE COMPLETED COLOGUARD THAT WAS NEGATIVE. PATIENT HAD MAMMOGRAM AND DEXA 9/2018.     PATIENT WAS REFERRED BACK TO ENT AND UROLOGY. SHE HAS CONTINUED HAND PAIN AND INABILITY TO CARRY THINGS OR SQUEEZE WITH HANDS. 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 SHE IS NO LONGER WORKING AT THAT JOB. I DISCUSSED AUTOIMMUNE TESTING VS FOLLOW UP WITH HAND SURGEON TODAY- SHE DECLINES REFERRAL OR TESTING BUT WILL LET ME KNOW IF SHE WOULD LIKE TO PROCEED AT ANY POINT. SHE WAS ALSO SEEN BY ENT WHO RECOMMEND PARATHYROID MASS BIOPSY AND SHE DECLINED AND HAS NOT RETURNED FOR FOLLOW UP. I ENCOURAGED HER TO RETURN TO ENT FOR FOLLOW UP. SHE DID NOT GO TO UROLOGY AS REFERRED EITHER. SHE HAS BEEN COUNSELED ON THE NEED FOR FOLLOW UP WITH ALL SPECIALISTS- ENT, UROLOGY, AND GYN.

## 2019-02-27 DIAGNOSIS — E89.0 POSTABLATIVE HYPOTHYROIDISM: ICD-10-CM

## 2019-02-27 RX ORDER — LEVOTHYROXINE SODIUM 112 UG/1
TABLET ORAL
Qty: 30 TABLET | Refills: 2 | Status: SHIPPED | OUTPATIENT
Start: 2019-02-27 | End: 2019-06-24 | Stop reason: SDUPTHER

## 2019-03-11 ENCOUNTER — OFFICE VISIT (OUTPATIENT)
Dept: FAMILY MEDICINE CLINIC | Facility: CLINIC | Age: 54
End: 2019-03-11

## 2019-03-11 VITALS
HEIGHT: 66 IN | HEART RATE: 81 BPM | WEIGHT: 156.4 LBS | OXYGEN SATURATION: 98 % | TEMPERATURE: 97.5 F | SYSTOLIC BLOOD PRESSURE: 106 MMHG | BODY MASS INDEX: 25.13 KG/M2 | DIASTOLIC BLOOD PRESSURE: 74 MMHG

## 2019-03-11 DIAGNOSIS — L02.01 CUTANEOUS ABSCESS OF FACE: Primary | ICD-10-CM

## 2019-03-11 PROCEDURE — 99213 OFFICE O/P EST LOW 20 MIN: CPT | Performed by: PHYSICIAN ASSISTANT

## 2019-03-11 RX ORDER — GINSENG 100 MG
CAPSULE ORAL 3 TIMES DAILY
Qty: 14 G | Refills: 0 | Status: SHIPPED | OUTPATIENT
Start: 2019-03-11 | End: 2019-07-24

## 2019-03-11 RX ORDER — DOXYCYCLINE HYCLATE 100 MG/1
100 CAPSULE ORAL 2 TIMES DAILY
Qty: 20 CAPSULE | Refills: 0 | Status: SHIPPED | OUTPATIENT
Start: 2019-03-11 | End: 2019-07-24

## 2019-03-11 NOTE — PROGRESS NOTES
Subjective   Isacc Aldridge is a 53 y.o. female. Patient complaining of lump with redness above left eye     History of Present Illness     Started 10 days ago with small red area. Tried to squeeze it and nothing came out. She then used tweezers and pulled some eyebrow hairs out from around lesion. She then tried to open with a needle. No D/C. Has gradually increased in size over 10 days. No other lesions. No spread of erythema, induration, edema.     The following portions of the patient's history were reviewed and updated as appropriate: allergies, current medications, past family history, past medical history, past social history, past surgical history and problem list.    Review of Systems   Skin:        Lump above left eye   All other systems reviewed and are negative.      Objective   Physical Exam   Constitutional: She is oriented to person, place, and time. She appears well-developed and well-nourished.   HENT:   Head: Normocephalic and atraumatic.       Right Ear: External ear normal.   Left Ear: External ear normal.   Nose: Nose normal.   Eyes: Conjunctivae and lids are normal.   Neck: Neck supple. Carotid bruit is not present.   Cardiovascular: Normal rate.   Pulmonary/Chest: Effort normal. No respiratory distress. She has no rhonchi.   Musculoskeletal: She exhibits no edema or deformity.   Neurological: She is alert and oriented to person, place, and time. Gait normal.   Skin: Skin is warm and dry.   Psychiatric: She has a normal mood and affect. Her speech is normal and behavior is normal. Judgment and thought content normal. Cognition and memory are normal.   Nursing note and vitals reviewed.      Assessment/Plan   Isacc was seen today for sore above left eye.    Diagnoses and all orders for this visit:    Cutaneous abscess of face  -     doxycycline (VIBRAMYCIN) 100 MG capsule; Take 1 capsule by mouth 2 (Two) Times a Day.  -     bacitracin 500 UNIT/GM ointment; Apply  topically to the appropriate  area as directed 3 (Three) Times a Day.      Patient Instructions   53 year old female who presents today with initial red area of left eyebrow 10 days ago. It has gradually increased in size. She has attempted to drain at home without d/c. she has small, very localized abscess left forehead at the eyebrow. No area to I&D or culture today. Warm compress for 10-15 minutes 3-4 x daily. Doxycycline 100 mg twice daily for 10 days. Change bandaid with Bacitracin 3 x daily. To be seen ASAP if worsening, new, or changing symptoms or if no resolution of symptoms.

## 2019-03-11 NOTE — PATIENT INSTRUCTIONS
53 year old female who presents today with initial red area of left eyebrow 10 days ago. It has gradually increased in size. She has attempted to drain at home without d/c. she has small, very localized abscess left forehead at the eyebrow. No area to I&D or culture today. Warm compress for 10-15 minutes 3-4 x daily. Doxycycline 100 mg twice daily for 10 days. Change bandaid with Bacitracin 3 x daily. To be seen ASAP if worsening, new, or changing symptoms or if no resolution of symptoms.

## 2019-06-24 DIAGNOSIS — E89.0 POSTABLATIVE HYPOTHYROIDISM: ICD-10-CM

## 2019-06-24 RX ORDER — LEVOTHYROXINE SODIUM 112 UG/1
TABLET ORAL
Qty: 30 TABLET | Refills: 0 | Status: SHIPPED | OUTPATIENT
Start: 2019-06-24 | End: 2019-08-01 | Stop reason: SDUPTHER

## 2019-07-24 ENCOUNTER — OFFICE VISIT (OUTPATIENT)
Dept: FAMILY MEDICINE CLINIC | Facility: CLINIC | Age: 54
End: 2019-07-24

## 2019-07-24 VITALS
HEIGHT: 66 IN | HEART RATE: 73 BPM | DIASTOLIC BLOOD PRESSURE: 78 MMHG | SYSTOLIC BLOOD PRESSURE: 104 MMHG | TEMPERATURE: 98.4 F | BODY MASS INDEX: 24.56 KG/M2 | WEIGHT: 152.8 LBS | OXYGEN SATURATION: 97 %

## 2019-07-24 DIAGNOSIS — E55.9 VITAMIN D DEFICIENCY: ICD-10-CM

## 2019-07-24 DIAGNOSIS — D35.1 PARATHYROID ADENOMA: ICD-10-CM

## 2019-07-24 DIAGNOSIS — E89.0 POSTABLATIVE HYPOTHYROIDISM: ICD-10-CM

## 2019-07-24 DIAGNOSIS — E78.49 OTHER HYPERLIPIDEMIA: Primary | ICD-10-CM

## 2019-07-24 PROCEDURE — 99214 OFFICE O/P EST MOD 30 MIN: CPT | Performed by: PHYSICIAN ASSISTANT
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
## 2019-07-25 LAB
25(OH)D3+25(OH)D2 SERPL-MCNC: 34.1 NG/ML (ref 30–100)
ALBUMIN SERPL-MCNC: 4.8 G/DL (ref 3.5–5.2)
ALBUMIN/GLOB SERPL: 2 G/DL
ALP SERPL-CCNC: 71 U/L (ref 39–117)
ALT SERPL-CCNC: 15 U/L (ref 1–33)
AST SERPL-CCNC: 16 U/L (ref 1–32)
BASOPHILS # BLD AUTO: 0.02 10*3/MM3 (ref 0–0.2)
BASOPHILS NFR BLD AUTO: 0.4 % (ref 0–1.5)
BILIRUB SERPL-MCNC: 0.6 MG/DL (ref 0.2–1.2)
BUN SERPL-MCNC: 10 MG/DL (ref 6–20)
BUN/CREAT SERPL: 12 (ref 7–25)
CALCIUM SERPL-MCNC: 9.8 MG/DL (ref 8.6–10.5)
CHLORIDE SERPL-SCNC: 105 MMOL/L (ref 98–107)
CHOLEST SERPL-MCNC: 238 MG/DL (ref 0–200)
CK SERPL-CCNC: 64 U/L (ref 20–180)
CO2 SERPL-SCNC: 26.2 MMOL/L (ref 22–29)
CREAT SERPL-MCNC: 0.83 MG/DL (ref 0.57–1)
EOSINOPHIL # BLD AUTO: 0.04 10*3/MM3 (ref 0–0.4)
EOSINOPHIL NFR BLD AUTO: 0.9 % (ref 0.3–6.2)
ERYTHROCYTE [DISTWIDTH] IN BLOOD BY AUTOMATED COUNT: 12.3 % (ref 12.3–15.4)
FT4I SERPL CALC-MCNC: 3.8 (ref 1.2–4.9)
GLOBULIN SER CALC-MCNC: 2.4 GM/DL
GLUCOSE SERPL-MCNC: 110 MG/DL (ref 65–99)
HCT VFR BLD AUTO: 43 % (ref 34–46.6)
HDLC SERPL-MCNC: 53 MG/DL (ref 40–60)
HGB BLD-MCNC: 14.4 G/DL (ref 12–15.9)
IMM GRANULOCYTES # BLD AUTO: 0.01 10*3/MM3 (ref 0–0.05)
IMM GRANULOCYTES NFR BLD AUTO: 0.2 % (ref 0–0.5)
LDLC SERPL CALC-MCNC: 167 MG/DL (ref 0–100)
LDLC/HDLC SERPL: 3.15 {RATIO}
LYMPHOCYTES # BLD AUTO: 1.18 10*3/MM3 (ref 0.7–3.1)
LYMPHOCYTES NFR BLD AUTO: 26 % (ref 19.6–45.3)
MCH RBC QN AUTO: 31.4 PG (ref 26.6–33)
MCHC RBC AUTO-ENTMCNC: 33.5 G/DL (ref 31.5–35.7)
MCV RBC AUTO: 93.7 FL (ref 79–97)
MONOCYTES # BLD AUTO: 0.29 10*3/MM3 (ref 0.1–0.9)
MONOCYTES NFR BLD AUTO: 6.4 % (ref 5–12)
NEUTROPHILS # BLD AUTO: 2.99 10*3/MM3 (ref 1.7–7)
NEUTROPHILS NFR BLD AUTO: 66.1 % (ref 42.7–76)
NRBC BLD AUTO-RTO: 0 /100 WBC (ref 0–0.2)
PLATELET # BLD AUTO: 162 10*3/MM3 (ref 140–450)
POTASSIUM SERPL-SCNC: 4.3 MMOL/L (ref 3.5–5.2)
PROT SERPL-MCNC: 7.2 G/DL (ref 6–8.5)
PTH-INTACT SERPL-MCNC: 29 PG/ML (ref 15–65)
RBC # BLD AUTO: 4.59 10*6/MM3 (ref 3.77–5.28)
SODIUM SERPL-SCNC: 143 MMOL/L (ref 136–145)
T3RU NFR SERPL: 33 % (ref 24–39)
T4 SERPL-MCNC: 11.6 UG/DL (ref 4.5–12)
TRIGL SERPL-MCNC: 89 MG/DL (ref 0–150)
TSH SERPL DL<=0.005 MIU/L-ACNC: 0.84 UIU/ML (ref 0.45–4.5)
VLDLC SERPL CALC-MCNC: 17.8 MG/DL
WBC # BLD AUTO: 4.53 10*3/MM3 (ref 3.4–10.8)

## 2019-08-01 DIAGNOSIS — E89.0 POSTABLATIVE HYPOTHYROIDISM: ICD-10-CM

## 2019-08-01 RX ORDER — LEVOTHYROXINE SODIUM 112 UG/1
TABLET ORAL
Qty: 30 TABLET | Refills: 0 | Status: SHIPPED | OUTPATIENT
Start: 2019-08-01 | End: 2019-09-02 | Stop reason: SDUPTHER

## 2019-09-02 DIAGNOSIS — E89.0 POSTABLATIVE HYPOTHYROIDISM: ICD-10-CM

## 2019-09-03 RX ORDER — LEVOTHYROXINE SODIUM 112 UG/1
TABLET ORAL
Qty: 30 TABLET | Refills: 0 | Status: SHIPPED | OUTPATIENT
Start: 2019-09-03 | End: 2019-10-07 | Stop reason: SDUPTHER

## 2019-10-07 DIAGNOSIS — E89.0 POSTABLATIVE HYPOTHYROIDISM: ICD-10-CM

## 2019-10-07 RX ORDER — LEVOTHYROXINE SODIUM 112 UG/1
TABLET ORAL
Qty: 30 TABLET | Refills: 0 | Status: SHIPPED | OUTPATIENT
Start: 2019-10-07 | End: 2019-11-18 | Stop reason: SDUPTHER

## 2019-10-29 ENCOUNTER — OFFICE VISIT (OUTPATIENT)
Dept: FAMILY MEDICINE CLINIC | Facility: CLINIC | Age: 54
End: 2019-10-29

## 2019-10-29 VITALS
SYSTOLIC BLOOD PRESSURE: 130 MMHG | WEIGHT: 148.4 LBS | DIASTOLIC BLOOD PRESSURE: 74 MMHG | HEIGHT: 66 IN | HEART RATE: 81 BPM | TEMPERATURE: 98.1 F | OXYGEN SATURATION: 98 % | BODY MASS INDEX: 23.85 KG/M2 | RESPIRATION RATE: 16 BRPM

## 2019-10-29 DIAGNOSIS — R73.01 ELEVATED FASTING GLUCOSE: ICD-10-CM

## 2019-10-29 DIAGNOSIS — E78.49 OTHER HYPERLIPIDEMIA: Primary | ICD-10-CM

## 2019-10-29 DIAGNOSIS — E89.0 POSTABLATIVE HYPOTHYROIDISM: ICD-10-CM

## 2019-10-29 DIAGNOSIS — R31.9 HEMATURIA, UNSPECIFIED TYPE: ICD-10-CM

## 2019-10-29 DIAGNOSIS — D35.1 PARATHYROID ADENOMA: ICD-10-CM

## 2019-10-29 DIAGNOSIS — E55.9 VITAMIN D DEFICIENCY: ICD-10-CM

## 2019-10-29 LAB
BILIRUB BLD-MCNC: NEGATIVE MG/DL
CLARITY, POC: CLEAR
COLOR UR: YELLOW
GLUCOSE UR STRIP-MCNC: NEGATIVE MG/DL
KETONES UR QL: NEGATIVE
LEUKOCYTE EST, POC: ABNORMAL
NITRITE UR-MCNC: NEGATIVE MG/ML
PH UR: 5.5 [PH] (ref 5–8)
PROT UR STRIP-MCNC: NEGATIVE MG/DL
RBC # UR STRIP: ABNORMAL /UL
SP GR UR: 1.02 (ref 1–1.03)
UROBILINOGEN UR QL: NORMAL

## 2019-10-29 PROCEDURE — 99214 OFFICE O/P EST MOD 30 MIN: CPT | Performed by: PHYSICIAN ASSISTANT

## 2019-10-29 NOTE — PROGRESS NOTES
Subjective   Isacc Aldridge is a 54 y.o. female present today for medication management on hypothyroidism and hyperlipidemia.      History of Present Illness     Patient has been doing well without complaints. 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 She is working more hours than she anticipated but is overall happy to be there. Taking medications without AE.     The following portions of the patient's history were reviewed and updated as appropriate: allergies, current medications, past family history, past medical history, past social history, past surgical history and problem list.    Review of Systems   All other systems reviewed and are negative.      Objective   Physical Exam   Constitutional: She is oriented to person, place, and time. She appears well-developed and well-nourished.   HENT:   Head: Normocephalic and atraumatic.   Right Ear: External ear normal.   Left Ear: External ear normal.   Nose: Nose normal.   Eyes: Conjunctivae and lids are normal.   Neck: Neck supple. Carotid bruit is not present.   Cardiovascular: Normal rate, regular rhythm, normal heart sounds and intact distal pulses. Exam reveals no gallop and no friction rub.   No murmur heard.  Pulmonary/Chest: Effort normal and breath sounds normal. No respiratory distress. She has no wheezes. She has no rhonchi. She has no rales.   Musculoskeletal: She exhibits no edema or deformity.   Neurological: She is alert and oriented to person, place, and time. Gait normal.   Skin: Skin is warm and dry.   Psychiatric: She has a normal mood and affect. Her speech is normal and behavior is normal. Judgment and thought content normal. Cognition and memory are normal.   Nursing note and vitals reviewed.      Assessment/Plan   Isacc was seen today for med management.    Diagnoses and all orders for this visit:    Other hyperlipidemia  -     Comprehensive Metabolic Panel  -     CK  -     Lipid Panel With LDL / HDL Ratio  -      POC Urinalysis Dipstick, Automated    Postablative hypothyroidism  -     CBC & Differential  -     Comprehensive Metabolic Panel  -     Lipid Panel With LDL / HDL Ratio  -     TSH  -     T4, free  -     T3, Free  -     Hemoglobin A1c  -     POC Urinalysis Dipstick, Automated    Vitamin D deficiency  -     Comprehensive Metabolic Panel  -     Vitamin D 25 Hydroxy  -     POC Urinalysis Dipstick, Automated    Parathyroid adenoma  -     POC Urinalysis Dipstick, Automated    Elevated fasting glucose  -     Comprehensive Metabolic Panel  -     Hemoglobin A1c  -     POC Urinalysis Dipstick, Automated    Hematuria, unspecified type  -     Urinalysis With Microscopic - Urine, Clean Catch  -     Urine Culture - Urine, Urine, Clean Catch    Other orders  -     Microscopic Examination -      Patient Instructions   54-year-old female who presents today in follow-up of parathyroid adenoma, hematuria, hypothyroidism, and lipids. Patient will have fasting labs. Call if no results in 1 week. Stability of conditions, plan, follow up, and further recommendations pending labs.  Follow-up in 3 to 6 months if stable.     Patient was referred back to ENT and urology.  She has continued hand pain and inability to carry things or squeeze with her hands.  She has seen a hand specialist a couple times in the past for Workmen's Compensation.  She is no longer working at that job.  I discussed autoimmune testing versus follow-up with hand surgeon in the past.  She declines referral or testing but will let me know if she would like to proceed any point.  She was also seen by ENT who recommend parathyroid mass biopsy and she declined and does not return for follow-up.  I encouraged her to return to ENT for follow-up.  I have discussed risks of untreated parathyroid mass.  She did not go to urology as referred either.  She has been counseled on the need for follow-up with all specialists-ENT, urology, and GYN.     She is up-to-date on Tdap and  declines pneumococcal vaccines and flu vaccine.  She was referred to GYN for annual exam and Pap (as she reports Pap smears have always been abnormal and she has needed several colposcopies/biopsies).  She had appointment 10/4/2018 and canceled appointment.  She needs to reschedule GYN referral.  She completed a Cologuard that was negative.  Patient had mammogram and DEXA 9/2018 but is overdue for mammogram.

## 2019-10-30 LAB
25(OH)D3+25(OH)D2 SERPL-MCNC: 20.7 NG/ML (ref 30–100)
ALBUMIN SERPL-MCNC: 4.8 G/DL (ref 3.5–5.5)
ALBUMIN/GLOB SERPL: 1.8 {RATIO} (ref 1.2–2.2)
ALP SERPL-CCNC: 74 IU/L (ref 39–117)
ALT SERPL-CCNC: 16 IU/L (ref 0–32)
AST SERPL-CCNC: 18 IU/L (ref 0–40)
BASOPHILS # BLD AUTO: 0 X10E3/UL (ref 0–0.2)
BASOPHILS NFR BLD AUTO: 0 %
BILIRUB SERPL-MCNC: 0.4 MG/DL (ref 0–1.2)
BUN SERPL-MCNC: 13 MG/DL (ref 6–24)
BUN/CREAT SERPL: 16 (ref 9–23)
CALCIUM SERPL-MCNC: 9.6 MG/DL (ref 8.7–10.2)
CHLORIDE SERPL-SCNC: 103 MMOL/L (ref 96–106)
CHOLEST SERPL-MCNC: 234 MG/DL (ref 100–199)
CK SERPL-CCNC: 73 U/L (ref 24–173)
CO2 SERPL-SCNC: 23 MMOL/L (ref 20–29)
CREAT SERPL-MCNC: 0.83 MG/DL (ref 0.57–1)
EOSINOPHIL # BLD AUTO: 0.1 X10E3/UL (ref 0–0.4)
EOSINOPHIL NFR BLD AUTO: 1 %
ERYTHROCYTE [DISTWIDTH] IN BLOOD BY AUTOMATED COUNT: 13.3 % (ref 12.3–15.4)
GLOBULIN SER CALC-MCNC: 2.6 G/DL (ref 1.5–4.5)
GLUCOSE SERPL-MCNC: 79 MG/DL (ref 65–99)
HBA1C MFR BLD: 5.6 % (ref 4.8–5.6)
HCT VFR BLD AUTO: 41.5 % (ref 34–46.6)
HDLC SERPL-MCNC: 56 MG/DL
HGB BLD-MCNC: 14.4 G/DL (ref 11.1–15.9)
IMM GRANULOCYTES # BLD AUTO: 0 X10E3/UL (ref 0–0.1)
IMM GRANULOCYTES NFR BLD AUTO: 0 %
LDLC SERPL CALC-MCNC: 156 MG/DL (ref 0–99)
LDLC/HDLC SERPL: 2.8 RATIO (ref 0–3.2)
LYMPHOCYTES # BLD AUTO: 1.8 X10E3/UL (ref 0.7–3.1)
LYMPHOCYTES NFR BLD AUTO: 36 %
MCH RBC QN AUTO: 30.9 PG (ref 26.6–33)
MCHC RBC AUTO-ENTMCNC: 34.7 G/DL (ref 31.5–35.7)
MCV RBC AUTO: 89 FL (ref 79–97)
MONOCYTES # BLD AUTO: 0.3 X10E3/UL (ref 0.1–0.9)
MONOCYTES NFR BLD AUTO: 6 %
NEUTROPHILS # BLD AUTO: 2.8 X10E3/UL (ref 1.4–7)
NEUTROPHILS NFR BLD AUTO: 57 %
PLATELET # BLD AUTO: 193 X10E3/UL (ref 150–450)
POTASSIUM SERPL-SCNC: 4.3 MMOL/L (ref 3.5–5.2)
PROT SERPL-MCNC: 7.4 G/DL (ref 6–8.5)
RBC # BLD AUTO: 4.66 X10E6/UL (ref 3.77–5.28)
SODIUM SERPL-SCNC: 141 MMOL/L (ref 134–144)
T3FREE SERPL-MCNC: 3 PG/ML (ref 2–4.4)
T4 FREE SERPL-MCNC: 1.93 NG/DL (ref 0.82–1.77)
TRIGL SERPL-MCNC: 109 MG/DL (ref 0–149)
TSH SERPL DL<=0.005 MIU/L-ACNC: 0.62 UIU/ML (ref 0.45–4.5)
VLDLC SERPL CALC-MCNC: 22 MG/DL (ref 5–40)
WBC # BLD AUTO: 5 X10E3/UL (ref 3.4–10.8)

## 2019-10-31 LAB
APPEARANCE UR: CLEAR
BACTERIA #/AREA URNS HPF: ABNORMAL /[HPF]
BACTERIA UR CULT: NORMAL
BACTERIA UR CULT: NORMAL
BILIRUB UR QL STRIP: NEGATIVE
COLOR UR: YELLOW
CRYSTALS URNS MICRO: ABNORMAL
EPI CELLS #/AREA URNS HPF: ABNORMAL /HPF
GLUCOSE UR QL: NEGATIVE
HGB UR QL STRIP: NEGATIVE
KETONES UR QL STRIP: NEGATIVE
LEUKOCYTE ESTERASE UR QL STRIP: (no result)
MICRO URNS: (no result)
MUCOUS THREADS URNS QL MICRO: PRESENT
NITRITE UR QL STRIP: NEGATIVE
PH UR STRIP: 5.5 [PH] (ref 5–7.5)
PROT UR QL STRIP: NEGATIVE
RBC #/AREA URNS HPF: ABNORMAL /HPF
SP GR UR: 1.02 (ref 1–1.03)
UNIDENT CRYS URNS QL MICRO: PRESENT
UROBILINOGEN UR STRIP-MCNC: 0.2 MG/DL (ref 0.2–1)
WBC #/AREA URNS HPF: ABNORMAL /HPF

## 2019-11-11 RX ORDER — ATORVASTATIN CALCIUM 40 MG/1
40 TABLET, FILM COATED ORAL NIGHTLY
Qty: 30 TABLET | Refills: 2 | Status: SHIPPED | OUTPATIENT
Start: 2019-11-11 | End: 2020-02-17

## 2019-11-18 DIAGNOSIS — E89.0 POSTABLATIVE HYPOTHYROIDISM: ICD-10-CM

## 2019-11-18 RX ORDER — LEVOTHYROXINE SODIUM 112 UG/1
112 TABLET ORAL DAILY
Qty: 30 TABLET | Refills: 2 | Status: SHIPPED | OUTPATIENT
Start: 2019-11-18 | End: 2020-02-20

## 2020-02-17 RX ORDER — ATORVASTATIN CALCIUM 40 MG/1
TABLET, FILM COATED ORAL
Qty: 30 TABLET | Refills: 0 | Status: SHIPPED | OUTPATIENT
Start: 2020-02-17 | End: 2020-03-17

## 2020-02-20 DIAGNOSIS — E89.0 POSTABLATIVE HYPOTHYROIDISM: ICD-10-CM

## 2020-02-20 RX ORDER — LEVOTHYROXINE SODIUM 112 UG/1
TABLET ORAL
Qty: 30 TABLET | Refills: 1 | Status: SHIPPED | OUTPATIENT
Start: 2020-02-20 | End: 2020-03-16 | Stop reason: SDUPTHER

## 2020-03-16 DIAGNOSIS — E89.0 POSTABLATIVE HYPOTHYROIDISM: ICD-10-CM

## 2020-03-16 RX ORDER — LEVOTHYROXINE SODIUM 112 UG/1
112 TABLET ORAL DAILY
Qty: 30 TABLET | Refills: 0 | Status: SHIPPED | OUTPATIENT
Start: 2020-03-16 | End: 2020-05-21

## 2020-03-17 RX ORDER — ATORVASTATIN CALCIUM 40 MG/1
TABLET, FILM COATED ORAL
Qty: 30 TABLET | Refills: 0 | Status: SHIPPED | OUTPATIENT
Start: 2020-03-17 | End: 2020-04-16

## 2020-04-16 RX ORDER — ATORVASTATIN CALCIUM 40 MG/1
TABLET, FILM COATED ORAL
Qty: 30 TABLET | Refills: 0 | Status: SHIPPED | OUTPATIENT
Start: 2020-04-16 | End: 2020-05-14

## 2020-05-14 RX ORDER — ATORVASTATIN CALCIUM 40 MG/1
TABLET, FILM COATED ORAL
Qty: 30 TABLET | Refills: 0 | Status: SHIPPED | OUTPATIENT
Start: 2020-05-14 | End: 2020-06-15

## 2020-05-21 DIAGNOSIS — E89.0 POSTABLATIVE HYPOTHYROIDISM: ICD-10-CM

## 2020-05-21 RX ORDER — LEVOTHYROXINE SODIUM 112 UG/1
TABLET ORAL
Qty: 15 TABLET | Refills: 0 | Status: SHIPPED | OUTPATIENT
Start: 2020-05-21 | End: 2020-06-10 | Stop reason: SDUPTHER

## 2020-06-10 DIAGNOSIS — E89.0 POSTABLATIVE HYPOTHYROIDISM: ICD-10-CM

## 2020-06-10 RX ORDER — LEVOTHYROXINE SODIUM 112 UG/1
112 TABLET ORAL DAILY
Qty: 15 TABLET | Refills: 0 | Status: SHIPPED | OUTPATIENT
Start: 2020-06-10 | End: 2020-06-30

## 2020-06-15 RX ORDER — ATORVASTATIN CALCIUM 40 MG/1
TABLET, FILM COATED ORAL
Qty: 30 TABLET | Refills: 0 | Status: SHIPPED | OUTPATIENT
Start: 2020-06-15 | End: 2020-07-13

## 2020-06-22 ENCOUNTER — OFFICE VISIT (OUTPATIENT)
Dept: FAMILY MEDICINE CLINIC | Facility: CLINIC | Age: 55
End: 2020-06-22

## 2020-06-22 VITALS
HEIGHT: 66 IN | DIASTOLIC BLOOD PRESSURE: 70 MMHG | SYSTOLIC BLOOD PRESSURE: 106 MMHG | WEIGHT: 140 LBS | TEMPERATURE: 99.3 F | HEART RATE: 87 BPM | BODY MASS INDEX: 22.5 KG/M2 | OXYGEN SATURATION: 98 %

## 2020-06-22 DIAGNOSIS — D35.1 PARATHYROID ADENOMA: ICD-10-CM

## 2020-06-22 DIAGNOSIS — F43.21 GRIEF REACTION: ICD-10-CM

## 2020-06-22 DIAGNOSIS — E55.9 VITAMIN D DEFICIENCY: ICD-10-CM

## 2020-06-22 DIAGNOSIS — E78.49 OTHER HYPERLIPIDEMIA: ICD-10-CM

## 2020-06-22 DIAGNOSIS — E89.0 POSTABLATIVE HYPOTHYROIDISM: Primary | ICD-10-CM

## 2020-06-22 DIAGNOSIS — R31.9 HEMATURIA, UNSPECIFIED TYPE: ICD-10-CM

## 2020-06-22 DIAGNOSIS — R73.01 ELEVATED FASTING GLUCOSE: ICD-10-CM

## 2020-06-22 PROCEDURE — 99214 OFFICE O/P EST MOD 30 MIN: CPT | Performed by: PHYSICIAN ASSISTANT

## 2020-06-22 NOTE — PROGRESS NOTES
"Subjective   Isacc Aldridge is a 54 y.o. female who presents today in follow-up of hypothyroidism, hyperlipidemia, vitamin D deficiency, parathyroid adenoma, hematuria, hand pain, and specialist.      History of Present Illness     Sister  of \"female\" cancer in 2020. Son \"was killed\"- she reports she was at work and a helicopter flew over her store when she was outside. She went back in and they called to say he  in MVA at age 21. She reports she was told he was dead on arrival. She had already been having trouble seeing the children for months because then mother in law is the one having the issue with visitation rather the children's mother. Patient's father is having issues. She repots her brother and sister were \"on a drunk streak\". They asked to borrow money. She is checking on her father twice daily (age 78) to help take care of him. Having to get all of his things and shopping. Has a daughter who is also struggling with her son's death. She reports her daughter is at the cemetary every day. He had been getting very depressed. Never got an apartment- stays with her father's or on \"[address] Has applied for apartments but has not been able to get one.     Hypothyroidism- has been stable on levothyroxine 112 mcg daily without signs or symptoms of hypothyroidism or hyperthyroidism.  Hyperlipidemia- continues Lipitor 40 mg at bedtime.  We increased dose to 40 mg 10/2019 and advised she follow-up in 3 months fasting.  She did not return until today.  Vitamin D deficiency- patient was advised to take vitamin D 4000 IU once daily 10/2019 and was advised she should follow-up in 3 months fasting.  Parathyroid adenoma- she is not following with ENT. She was also seen by ENT who recommend parathyroid mass biopsy and she declined and does not return for follow-up.  I encouraged her to return to ENT for follow-up.  I have discussed risks of untreated parathyroid mass.      Hematuria- patient was referred to " urology for hematuria.  She has been counseled at length about the need for further work-up to rule out malignancy or other etiology of hematuria.  She has not followed up with them as directed.     Hand pain-she has continued hand pain and inability to carry things or squeeze with her hands.  She has seen a hand specialist a couple times in the past for Workmen's Compensation.  She is no longer working at that job.  I discussed autoimmune testing versus follow-up with hand surgeon in the past.  She declines referral or testing but will let me know if she would like to proceed any point.      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 She is working more hours than she anticipated, worse with Covid 19 pandemic but is overall happy to be there.    The following portions of the patient's history were reviewed and updated as appropriate: allergies, current medications, past family history, past medical history, past social history, past surgical history and problem list.    Review of Systems   Constitutional: Positive for fatigue.   HENT: Negative.    Respiratory: Negative.    Cardiovascular: Negative.    Gastrointestinal: Negative.    Musculoskeletal: Positive for arthralgias and back pain.   Neurological: Negative.    Psychiatric/Behavioral: Positive for dysphoric mood. The patient is nervous/anxious.        Objective    Vitals:    06/22/20 1241   BP: 106/70   Pulse: 87   Temp: 99.3 °F (37.4 °C)   SpO2: 98%     Body mass index is 22.77 kg/m².    Physical Exam   Constitutional: She is oriented to person, place, and time. She appears well-developed and well-nourished.   HENT:   Head: Normocephalic and atraumatic.   Right Ear: External ear normal.   Left Ear: External ear normal.   Nose: Nose normal.   Eyes: Conjunctivae and lids are normal.   Neck: Neck supple. Carotid bruit is not present.   Cardiovascular: Normal rate, regular rhythm, normal heart sounds and intact distal pulses. Exam  reveals no gallop and no friction rub.   No murmur heard.  Pulmonary/Chest: Effort normal and breath sounds normal. No respiratory distress. She has no wheezes. She has no rhonchi. She has no rales.   Musculoskeletal: She exhibits no edema or deformity.   Neurological: She is alert and oriented to person, place, and time. Gait normal.   Skin: Skin is warm and dry.   Psychiatric: Her speech is normal and behavior is normal. Judgment and thought content normal. Her mood appears not anxious. Her affect is not angry, not blunt, not labile and not inappropriate. Cognition and memory are normal. She exhibits a depressed mood.   Nursing note and vitals reviewed.      Assessment/Plan   Isacc was seen today for hyperlipidemia.    Diagnoses and all orders for this visit:    Postablative hypothyroidism  -     CBC & Differential  -     Comprehensive Metabolic Panel  -     Hemoglobin A1c  -     TSH  -     T4, free  -     T3, Free    Other hyperlipidemia  -     Comprehensive Metabolic Panel  -     CK  -     Lipid Panel With LDL / HDL Ratio    Vitamin D deficiency  -     Comprehensive Metabolic Panel  -     Vitamin D 25 Hydroxy    Elevated fasting glucose  -     Comprehensive Metabolic Panel  -     Hemoglobin A1c  -     Urinalysis With Microscopic - Urine, Clean Catch    Parathyroid adenoma  -     Comprehensive Metabolic Panel  -     PTH, Intact    Hematuria, unspecified type  -     Urinalysis With Microscopic - Urine, Clean Catch    Grief reaction  -     Ambulatory Referral to Psychology    Other orders  -     Microscopic Examination -    Assessment and plan  54 y.o. female who presents today in follow-up of hypothyroidism, hyperlipidemia, vitamin D deficiency, parathyroid adenoma, hematuria, hand pain, and specialist.  She has been stable on levothyroxine 112 mcg daily without signs or symptoms of hypothyroidism or hyperthyroidism.  Patient continues Lipitor 40 mg at bedtime.  We increased dose to 40 mg 10/2019 and advised she  "follow-up in 3 months fasting.  She did not return until today.  She was advised to take vitamin D 4000 IU once daily 10/2019 and was advised she should follow-up in 3 months fasting. She has a known parathyroid adenoma and is not following with ENT. She was seen by ENT who recommend parathyroid mass biopsy and she declined and does not return for follow-up.  I encouraged her to return to ENT for follow-up.  I have discussed risks of untreated parathyroid mass. Patient was referred to urology for hematuria.  She has been counseled at length about the need for further work-up to rule out malignancy or other etiology of hematuria.  She has not followed up with them as directed.  Patient will have fasting labs. Call if no results in 1 week. Stability of conditions, plan, follow up, and further recommendations pending labs.  Follow-up in 3 to 6 months if stable.    Her sister  of \"female\" cancer in 2020 then her son \"was killed\" in MVA at age 21. She had already been having trouble seeing the children for months because the mother in law has issues with visitation rather the children's mother. Patient's father is having issues and her brother and sister were \"on a drunk streak\" and asking to borrow money. She is checking on her father twice daily (age 80) to help take care of him, having to get all of his things and do his shopping. Her daughter is also struggling with her son's death and is at the cemetary every day. Her son had been getting very depressed prior to his death. Patient is staying with her father, has applied for apartments but has not been able to get one. I discussed my recommendation for referral to psychology and psychiatry for treatment of her moods as well as adjustment reaction and grief reaction. She is willing to see a counselor for now but declines medication and psychiatry. I will refer to psychology today.     Patient has continued hand pain and inability to carry things or squeeze with " her hands.  She has seen a hand specialist a couple times in the past for Workmen's Compensation.  She is no longer working at that job.  I discussed autoimmune testing versus follow-up with hand surgeon in the past.  She declines referral or testing but will let me know if she would like to proceed any point.  She has been counseled on the need for follow-up with all specialists-ENT, urology, and GYN.     She is up-to-date on Tdap and declines pneumococcal vaccines and flu vaccine.  She was referred to GYN for annual exam and Pap (as she reports Pap smears have always been abnormal and she has needed several colposcopies/biopsies).  She had appointment 10/4/2018 and canceled appointment.  She needs to reschedule GYN referral. She did complete a Cologuard that was negative. Patient had mammogram and DEXA 9/2018 but is overdue for mammogram follow up.

## 2020-06-23 LAB
25(OH)D3+25(OH)D2 SERPL-MCNC: 24.4 NG/ML (ref 30–100)
ALBUMIN SERPL-MCNC: 4.5 G/DL (ref 3.8–4.9)
ALBUMIN/GLOB SERPL: 1.9 {RATIO} (ref 1.2–2.2)
ALP SERPL-CCNC: 76 IU/L (ref 39–117)
ALT SERPL-CCNC: 15 IU/L (ref 0–32)
APPEARANCE UR: CLEAR
AST SERPL-CCNC: 13 IU/L (ref 0–40)
BACTERIA #/AREA URNS HPF: NORMAL /[HPF]
BASOPHILS # BLD AUTO: 0 X10E3/UL (ref 0–0.2)
BASOPHILS NFR BLD AUTO: 0 %
BILIRUB SERPL-MCNC: 0.6 MG/DL (ref 0–1.2)
BILIRUB UR QL STRIP: NEGATIVE
BUN SERPL-MCNC: 11 MG/DL (ref 6–24)
BUN/CREAT SERPL: 13 (ref 9–23)
CALCIUM SERPL-MCNC: 9.6 MG/DL (ref 8.7–10.2)
CHLORIDE SERPL-SCNC: 105 MMOL/L (ref 96–106)
CHOLEST SERPL-MCNC: 227 MG/DL (ref 100–199)
CK SERPL-CCNC: 71 U/L (ref 32–182)
CO2 SERPL-SCNC: 25 MMOL/L (ref 20–29)
COLOR UR: YELLOW
CREAT SERPL-MCNC: 0.84 MG/DL (ref 0.57–1)
EOSINOPHIL # BLD AUTO: 0.1 X10E3/UL (ref 0–0.4)
EOSINOPHIL NFR BLD AUTO: 1 %
EPI CELLS #/AREA URNS HPF: NORMAL /HPF (ref 0–10)
ERYTHROCYTE [DISTWIDTH] IN BLOOD BY AUTOMATED COUNT: 12.6 % (ref 11.7–15.4)
GLOBULIN SER CALC-MCNC: 2.4 G/DL (ref 1.5–4.5)
GLUCOSE SERPL-MCNC: 86 MG/DL (ref 65–99)
GLUCOSE UR QL: NEGATIVE
HBA1C MFR BLD: 5.6 % (ref 4.8–5.6)
HCT VFR BLD AUTO: 44 % (ref 34–46.6)
HDLC SERPL-MCNC: 56 MG/DL
HGB BLD-MCNC: 14.3 G/DL (ref 11.1–15.9)
HGB UR QL STRIP: NEGATIVE
IMM GRANULOCYTES # BLD AUTO: 0 X10E3/UL (ref 0–0.1)
IMM GRANULOCYTES NFR BLD AUTO: 0 %
KETONES UR QL STRIP: NEGATIVE
LDLC SERPL CALC-MCNC: 146 MG/DL (ref 0–99)
LDLC/HDLC SERPL: 2.6 RATIO (ref 0–3.2)
LEUKOCYTE ESTERASE UR QL STRIP: NEGATIVE
LYMPHOCYTES # BLD AUTO: 1.9 X10E3/UL (ref 0.7–3.1)
LYMPHOCYTES NFR BLD AUTO: 36 %
MCH RBC QN AUTO: 30 PG (ref 26.6–33)
MCHC RBC AUTO-ENTMCNC: 32.5 G/DL (ref 31.5–35.7)
MCV RBC AUTO: 92 FL (ref 79–97)
MICRO URNS: NORMAL
MICRO URNS: NORMAL
MONOCYTES # BLD AUTO: 0.4 X10E3/UL (ref 0.1–0.9)
MONOCYTES NFR BLD AUTO: 7 %
MUCOUS THREADS URNS QL MICRO: PRESENT
NEUTROPHILS # BLD AUTO: 2.9 X10E3/UL (ref 1.4–7)
NEUTROPHILS NFR BLD AUTO: 56 %
NITRITE UR QL STRIP: NEGATIVE
PH UR STRIP: 7 [PH] (ref 5–7.5)
PLATELET # BLD AUTO: 171 X10E3/UL (ref 150–450)
POTASSIUM SERPL-SCNC: 4.2 MMOL/L (ref 3.5–5.2)
PROT SERPL-MCNC: 6.9 G/DL (ref 6–8.5)
PROT UR QL STRIP: NEGATIVE
PTH-INTACT SERPL-MCNC: 39 PG/ML (ref 15–65)
RBC # BLD AUTO: 4.77 X10E6/UL (ref 3.77–5.28)
RBC #/AREA URNS HPF: NORMAL /HPF (ref 0–2)
SODIUM SERPL-SCNC: 142 MMOL/L (ref 134–144)
SP GR UR: 1.01 (ref 1–1.03)
T3FREE SERPL-MCNC: 3.2 PG/ML (ref 2–4.4)
T4 FREE SERPL-MCNC: 1.9 NG/DL (ref 0.82–1.77)
TRIGL SERPL-MCNC: 124 MG/DL (ref 0–149)
TSH SERPL DL<=0.005 MIU/L-ACNC: 0.28 UIU/ML (ref 0.45–4.5)
UROBILINOGEN UR STRIP-MCNC: 0.2 MG/DL (ref 0.2–1)
VLDLC SERPL CALC-MCNC: 25 MG/DL (ref 5–40)
WBC # BLD AUTO: 5.3 X10E3/UL (ref 3.4–10.8)
WBC #/AREA URNS HPF: NORMAL /HPF (ref 0–5)

## 2020-06-29 ENCOUNTER — TELEPHONE (OUTPATIENT)
Dept: FAMILY MEDICINE CLINIC | Facility: CLINIC | Age: 55
End: 2020-06-29

## 2020-06-29 NOTE — TELEPHONE ENCOUNTER
Patient called about her labs and her levothyroxine script.  She is out and needs to know if you are changing the dose or keeping it the same.  Please send to pharmacy and notify of lab results.

## 2020-06-30 DIAGNOSIS — E89.0 POSTABLATIVE HYPOTHYROIDISM: ICD-10-CM

## 2020-06-30 RX ORDER — LEVOTHYROXINE SODIUM 0.1 MG/1
100 TABLET ORAL DAILY
Qty: 30 TABLET | Refills: 1 | Status: SHIPPED | OUTPATIENT
Start: 2020-06-30 | End: 2020-08-24 | Stop reason: SDUPTHER

## 2020-07-13 RX ORDER — ATORVASTATIN CALCIUM 40 MG/1
TABLET, FILM COATED ORAL
Qty: 30 TABLET | Refills: 1 | Status: SHIPPED | OUTPATIENT
Start: 2020-07-13 | End: 2020-09-10

## 2020-08-12 ENCOUNTER — OFFICE VISIT (OUTPATIENT)
Dept: FAMILY MEDICINE CLINIC | Facility: CLINIC | Age: 55
End: 2020-08-12

## 2020-08-12 VITALS
OXYGEN SATURATION: 95 % | SYSTOLIC BLOOD PRESSURE: 114 MMHG | DIASTOLIC BLOOD PRESSURE: 80 MMHG | BODY MASS INDEX: 22.5 KG/M2 | WEIGHT: 140 LBS | TEMPERATURE: 97.7 F | HEIGHT: 66 IN | HEART RATE: 90 BPM

## 2020-08-12 DIAGNOSIS — E89.0 POSTABLATIVE HYPOTHYROIDISM: Primary | ICD-10-CM

## 2020-08-12 DIAGNOSIS — R73.01 ELEVATED FASTING GLUCOSE: ICD-10-CM

## 2020-08-12 DIAGNOSIS — E55.9 VITAMIN D DEFICIENCY: ICD-10-CM

## 2020-08-12 DIAGNOSIS — E78.49 OTHER HYPERLIPIDEMIA: ICD-10-CM

## 2020-08-12 DIAGNOSIS — F43.21 GRIEF REACTION: ICD-10-CM

## 2020-08-12 DIAGNOSIS — D35.1 PARATHYROID ADENOMA: ICD-10-CM

## 2020-08-12 PROCEDURE — 99213 OFFICE O/P EST LOW 20 MIN: CPT | Performed by: PHYSICIAN ASSISTANT

## 2020-08-12 NOTE — PROGRESS NOTES
Subjective   Isacc Aldridge is a 55 y.o. female who presents today for 6-week follow-up of hypothyroidism, hyperlipidemia, vitamin D deficiency, and moods.      HPI     Hypothyroidism- was overmedicated on levothyroxine 112 mcg daily, so we decreased to levothyroxine 100 mcg daily. Reports she almost lost her job- states she was without medication and had worsening moods.   Hyperlipidemia- continues Lipitor 40 mg at bedtime.  We increased dose to 40 mg 10/2019 and advised she follow-up in 3 months fasting.  She did not return until today.  Vitamin D deficiency- patient was advised to take vitamin D 2000 IU once daily.   Parathyroid adenoma- she is not following with ENT. She was also seen by ENT who recommend parathyroid mass biopsy and she declined and does not return for follow-up.  I encouraged her to return to ENT for follow-up.  I have discussed risks of untreated parathyroid mass.      Moods- have not significantly improved but no worsening.    The following portions of the patient's history were reviewed and updated as appropriate: allergies, current medications, past family history, past medical history, past social history, past surgical history and problem list.    Review of Systems   Constitutional: Positive for fatigue.   HENT: Negative.    Respiratory: Negative.    Cardiovascular: Negative.    Gastrointestinal: Negative.    Musculoskeletal: Positive for arthralgias and back pain.   Neurological: Negative.    Psychiatric/Behavioral: Positive for dysphoric mood. The patient is nervous/anxious.        Objective    Vitals:    08/12/20 1350   BP: 114/80   Pulse: 90   Temp: 97.7 °F (36.5 °C)   SpO2: 95%     Body mass index is 22.77 kg/m².    Physical Exam   Constitutional: She is oriented to person, place, and time. She appears well-developed and well-nourished.   HENT:   Head: Normocephalic and atraumatic.   Right Ear: External ear normal.   Left Ear: External ear normal.   Nose: Nose normal.   Eyes:  Conjunctivae and lids are normal.   Neck: Neck supple. Carotid bruit is not present.   Cardiovascular: Normal rate, regular rhythm, normal heart sounds and intact distal pulses. Exam reveals no gallop and no friction rub.   No murmur heard.  Pulmonary/Chest: Effort normal and breath sounds normal. No respiratory distress. She has no wheezes. She has no rhonchi. She has no rales.   Musculoskeletal: She exhibits no edema or deformity.   Neurological: She is alert and oriented to person, place, and time. Gait normal.   Skin: Skin is warm and dry.   Psychiatric: Her speech is normal. Judgment and thought content normal. Her mood appears not anxious. Her affect is angry. Her affect is not blunt, not labile and not inappropriate. She is agitated. She is not aggressive, not hyperactive, not slowed, not withdrawn and not combative. Cognition and memory are normal. She exhibits a depressed mood.   Nursing note and vitals reviewed.      Assessment/Plan   Isacc was seen today for hypothyroidism.    Diagnoses and all orders for this visit:    Postablative hypothyroidism  -     TSH  -     T4, free  -     T3, Free    Other hyperlipidemia    Vitamin D deficiency    Elevated fasting glucose    Parathyroid adenoma    Grief reaction    Assessment and plan  55 y.o. female who presents today for 6-week follow-up of hypothyroidism, hyperlipidemia, vitamin D deficiency, and moods. Her thyroid was overmedicated with her previous visit, and levothyroxine was decreased from 112 mcg to 100 mcg daily.  Patient continues Lipitor 40 mg at bedtime.  She has a low vitamin D and was advised to take vitamin D 2000 IU once daily. Patient will have thyroid rechecked today and will need follow-up in no more than 6 weeks.    Her sister  in 2020 then her son  in MVA at age 21. She was having trouble seeing her grandchildren for months because of the children's other grandmother. Patient's father is having issues and her brother and sister were  "\"on a drunk streak\" and asking to borrow money. She is checking on her father twice daily (age 80) to help take care of him, having to get all of his things and do his shopping, and her daughter's grief from losing her brother. I discussed my recommendation for referral to psychology and psychiatry for treatment of her moods as well as adjustment reaction and grief reaction. She was referred to psychology but declines medication and psychiatry. She has done a couple sessions but does not think it has been helpful. Her moods have not worsened but have not improved significantly. Patient to continue counseling and consider psychiatry for medication.         "

## 2020-08-13 LAB
T3FREE SERPL-MCNC: 2.7 PG/ML (ref 2–4.4)
T4 FREE SERPL-MCNC: 1.74 NG/DL (ref 0.82–1.77)
TSH SERPL DL<=0.005 MIU/L-ACNC: 0.98 UIU/ML (ref 0.45–4.5)

## 2020-08-24 DIAGNOSIS — E89.0 POSTABLATIVE HYPOTHYROIDISM: ICD-10-CM

## 2020-08-25 RX ORDER — LEVOTHYROXINE SODIUM 0.1 MG/1
100 TABLET ORAL DAILY
Qty: 30 TABLET | Refills: 4 | Status: SHIPPED | OUTPATIENT
Start: 2020-08-25 | End: 2021-01-22

## 2020-09-10 RX ORDER — ATORVASTATIN CALCIUM 40 MG/1
TABLET, FILM COATED ORAL
Qty: 30 TABLET | Refills: 5 | Status: SHIPPED | OUTPATIENT
Start: 2020-09-10 | End: 2021-03-17

## 2021-01-13 ENCOUNTER — OFFICE VISIT (OUTPATIENT)
Dept: FAMILY MEDICINE CLINIC | Facility: CLINIC | Age: 56
End: 2021-01-13

## 2021-01-13 VITALS
RESPIRATION RATE: 16 BRPM | SYSTOLIC BLOOD PRESSURE: 120 MMHG | HEART RATE: 97 BPM | BODY MASS INDEX: 22.5 KG/M2 | HEIGHT: 66 IN | OXYGEN SATURATION: 99 % | TEMPERATURE: 98.2 F | WEIGHT: 140 LBS | DIASTOLIC BLOOD PRESSURE: 70 MMHG

## 2021-01-13 DIAGNOSIS — K64.5 THROMBOSED EXTERNAL HEMORRHOID: ICD-10-CM

## 2021-01-13 DIAGNOSIS — E78.49 OTHER HYPERLIPIDEMIA: ICD-10-CM

## 2021-01-13 DIAGNOSIS — R73.01 ELEVATED FASTING GLUCOSE: ICD-10-CM

## 2021-01-13 DIAGNOSIS — R25.2 LEG CRAMPING: ICD-10-CM

## 2021-01-13 DIAGNOSIS — E55.9 VITAMIN D DEFICIENCY: ICD-10-CM

## 2021-01-13 DIAGNOSIS — E89.0 POSTABLATIVE HYPOTHYROIDISM: Primary | ICD-10-CM

## 2021-01-13 DIAGNOSIS — D35.1 PARATHYROID ADENOMA: ICD-10-CM

## 2021-01-13 DIAGNOSIS — R53.83 FATIGUE, UNSPECIFIED TYPE: ICD-10-CM

## 2021-01-13 DIAGNOSIS — N95.0 POSTMENOPAUSAL BLEEDING: ICD-10-CM

## 2021-01-13 DIAGNOSIS — R42 DIZZINESS: ICD-10-CM

## 2021-01-13 DIAGNOSIS — L02.31 ABSCESS OF LEFT BUTTOCK: ICD-10-CM

## 2021-01-13 DIAGNOSIS — N76.0 ACUTE VAGINITIS: ICD-10-CM

## 2021-01-13 PROCEDURE — 99214 OFFICE O/P EST MOD 30 MIN: CPT | Performed by: PHYSICIAN ASSISTANT

## 2021-01-13 RX ORDER — METRONIDAZOLE 7.5 MG/G
GEL VAGINAL NIGHTLY
Qty: 70 G | Refills: 0 | Status: SHIPPED | OUTPATIENT
Start: 2021-01-13 | End: 2021-01-22

## 2021-01-13 NOTE — PROGRESS NOTES
"Subjective   Isacc Aldridge is a 55 y.o. female who presents today for 6-week follow-up of hypothyroidism, hyperlipidemia, vitamin D deficiency, and moods.      Hemorrhoids  Associated symptoms include arthralgias and fatigue.   2020- left buttock had a boil- opened and drained. Now has improved and is just a spot and is a scab.     Sits bath made raw- She got a hemorrhoid that was very painful and went to the pharmacy who advised she use preparation H- felt raw. Thought bleeding from vaginal adolfo not her hemorrhoid but the bleeding is on the back of her pad/pantyliner. Pain from hemorrhoid has not resolved but has improved significantly. She also has a vaginal odor and some d/c.     Right leg \"knotting up\" and hurting. Felt tired and dizzy- from working so much. She reports she feels better once she sits and rests for a little while.      Hypothyroidism- was overmedicated on levothyroxine 112 mcg daily, so we decreased to levothyroxine 100 mcg daily. She feels overwhelmed at work but no other symptoms of hyperthyroidism/ hypothyroidism.   Hyperlipidemia- continues Lipitor at bedtime and is tolerating without AE.  Elevated fasting glucose- patient is on no medications. No significant dietary changes.    Vitamin D deficiency- patient was advised to take vitamin D 2000 IU once daily.   Parathyroid adenoma- she is not following with ENT. She was seen by ENT who recommend parathyroid mass biopsy. She declined and does not return for follow-up.  I encouraged her to return to ENT for follow-up and have discussed risks of untreated parathyroid mass.      Moods- have improved with counseling. She does feel this is helpful. Patient has had to miss a couple of virtual appts due to getting called into work. She is working all the time. However, therapy is helping with her loss/grief and moods overall.   · Her sister  in 2020 then her son  in MVA at age 21. She was having trouble seeing her grandchildren for " "months because of the children's other grandmother. Patient's father was having issues and her brother and sister were \"on a drunk streak\" and asking to borrow money. She was checking on her father twice daily (age 78) to help take care of him, having to get all of his things and do his shopping, and her daughter's grief from losing her brother.   · I discussed my recommendation for referral to psychology and psychiatry for treatment of her moods as well as adjustment reaction and grief reaction. She was referred to psychology but declines medication and psychiatry.     The following portions of the patient's history were reviewed and updated as appropriate: allergies, current medications, past family history, past medical history, past social history, past surgical history and problem list.    Review of Systems   Constitutional: Positive for fatigue.   HENT: Negative.    Respiratory: Negative.    Cardiovascular: Negative.    Gastrointestinal: Positive for hemorrhoids.        Hemorrhoids with pain and bleeding   Genitourinary: Positive for vaginal discharge.   Musculoskeletal: Positive for arthralgias and back pain.   Neurological: Negative.    Psychiatric/Behavioral: Positive for dysphoric mood. The patient is nervous/anxious.        Objective    Vitals:    01/13/21 1037   BP: 120/70   Pulse: 97   Resp: 16   Temp: 98.2 °F (36.8 °C)   SpO2: 99%     Body mass index is 22.6 kg/m².    Physical Exam   Constitutional: She is oriented to person, place, and time. She appears well-developed.   HENT:   Head: Normocephalic and atraumatic.   Right Ear: External ear normal.   Left Ear: External ear normal.   Nose: Nose normal.   Eyes: Conjunctivae and lids are normal.   Neck: Neck supple. Carotid bruit is not present.   Cardiovascular: Normal rate, regular rhythm and normal heart sounds. Exam reveals no gallop and no friction rub.   No murmur heard.  Pulmonary/Chest: Effort normal and breath sounds normal. No respiratory " distress. She has no wheezes. She has no rhonchi. She has no rales.   Genitourinary: Rectum:      External hemorrhoid (with small clot in hemorrhoid- draining scant blood. No erythema, edema, induration, or other d/c ) present.     Musculoskeletal: No deformity.   Neurological: She is alert and oriented to person, place, and time. Gait normal.   Skin: Skin is warm and dry.        Psychiatric: Her speech is normal and behavior is normal. Mood and thought content normal. Her mood appears not anxious. Her affect is not blunt, not labile and not inappropriate. She is not aggressive, not hyperactive, not slowed, not withdrawn and not combative.   Nursing note and vitals reviewed.      Assessment/Plan   Diagnoses and all orders for this visit:    1. Postablative hypothyroidism (Primary)  -     CBC & Differential  -     Comprehensive Metabolic Panel  -     Lipid Panel With LDL / HDL Ratio  -     Vitamin D 25 Hydroxy  -     TSH  -     T4, free  -     T3, Free    2. Other hyperlipidemia  -     CBC & Differential  -     Comprehensive Metabolic Panel  -     Lipid Panel With LDL / HDL Ratio    3. Elevated fasting glucose  -     CBC & Differential  -     Comprehensive Metabolic Panel    4. Vitamin D deficiency  -     CBC & Differential  -     Comprehensive Metabolic Panel  -     Vitamin D 25 Hydroxy    5. Parathyroid adenoma  -     Comprehensive Metabolic Panel  -     Vitamin D 25 Hydroxy    6. Abscess of left buttock    7. Thrombosed external hemorrhoid    8. Postmenopausal bleeding  -     Cancel: Ambulatory Referral to Gynecology  -     CBC & Differential  -     Ambulatory Referral to Gynecology    9. Acute vaginitis  -     Discontinue: metroNIDAZOLE (METROGEL VAGINAL) 0.75 % vaginal gel; Insert  into the vagina Every Night.  Dispense: 70 g; Refill: 0    10. Leg cramping  -     CBC & Differential  -     Comprehensive Metabolic Panel    11. Fatigue, unspecified type  -     CBC & Differential  -     Vitamin D 25 Hydroxy  -      TSH  -     T4, free  -     T3, Free    12. Dizziness  -     CBC & Differential  -     Comprehensive Metabolic Panel  -     TSH  -     T4, free  -     T3, Free    Assessment and plan  Patient will have fasting labs. Call if no results in 1 week. Stability of conditions, plan, follow up, and further recommendations pending labs. If stable, follow up in 3-6 months.      · Hypothyroidism- She had over-medicated thyroid and had dosage decreased with stable thyroid testing 8/2020. Continue levothyroxine 100 mcg daily. Dosage changes pending labs.    · Hyperlipidemia- Last lipids were uncontrolled 6/2020. Continue Lipitor 40 mg at bedtime. Further recommendations once labs are received.   · Elevated fasting glucose- Last A1C was normal at 5.6% 6/2020. I will continue to monitor.   · Vitamin D deficiency- Dosing recommendations pending labs.   · Parathyroid adenoma- Patient has been counseled at length about the need to follow up with ENT, compliance with their treatment recommendations, and the risks of not having testing. She will let me know when she is willing to see ENT.   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 She has not been willing to see psychiatry for medication. If she has uncontrolled moods, worsening moods, or if counseling is not able to control mood symptoms, she should see the psychiatrist that works with them for consideration of medication.   · Abscess left buttock- 11/2020, she developed a redness and soreness on her left buttock. The area increased in size to a large abscess. She then had spontaneous rupture and d/c and continues sitz baths, cleaning and keeping covered. The area has healed with no erythema, edema, or abscess. She has some healing scar tissue. I counseled her on the risks of such severe infections. She should be seen if recurrence or if she has similar lesions in the future.   · Thrombosed external hemorrhoid- Patient had  spontaneous rupture and has some continued bleeding from open, draining, thrombosed hemorrhoid without erythema, edema, or signs/ symptoms of infection. She continues Sitz baths. I advised continue Preparation H, current hygiene regimen, and to call or be seen if worsening, new, or changing symptoms. We will consider general surgery if no resolution of symptoms.   · Possible postmenopausal bleeding- She is uncertain if the bleeding is from her hemorrhoid or vaginal bleeding, however, with wiping, she thinks this could be vaginal bleeding. She has not had menses in several years. I discussed with her that she needs evaluation with GYN for possible postmenopausal bleeding but is also overdue for annual GYN exam and should complete this. I will refer to GYN today.   · Vaginitis- Patient has no itching or significant vaginal d/c but has a mild d/c with an odor. I will have her try Metrogel vaginal suppository at night x 5 nights. She should discuss further with GYN when she goes for evaluation.   · Right leg cramping, fatigue, and dizziness- She reports this has only occurred with increased working and not being able to rest, eat and drink properly. I will check labs today. Patient to let me know if no resolution, worsening, new, or changing symptoms and I will consider further workup or treatment or a drug holiday from Lipitor for 2 weeks to see if we need to change medication.    I spent 35 minutes caring for Isacc Aldridge on this date of service. This time includes time spent by me in the following activities: preparing for the visit, reviewing tests, specialists records, and previous visits, obtaining and/or reviewing a separately obtained history, performing a medically appropriate examination and/or evaluation, counseling and educating the patient/family/caregiver, referring and/or communicating with other health care professionals as necessary, documenting information in the medical record, independently  interpreting results and communicating that information with the patient/family/caregiver, and developing a medically appropriate treatment plan with consideration of other conditions, medications, and treatments.

## 2021-01-14 LAB
25(OH)D3+25(OH)D2 SERPL-MCNC: 17.9 NG/ML (ref 30–100)
ALBUMIN SERPL-MCNC: 4.6 G/DL (ref 3.8–4.9)
ALBUMIN/GLOB SERPL: 1.7 {RATIO} (ref 1.2–2.2)
ALP SERPL-CCNC: 76 IU/L (ref 39–117)
ALT SERPL-CCNC: 14 IU/L (ref 0–32)
AST SERPL-CCNC: 20 IU/L (ref 0–40)
BASOPHILS # BLD AUTO: 0 X10E3/UL (ref 0–0.2)
BASOPHILS NFR BLD AUTO: 0 %
BILIRUB SERPL-MCNC: 0.7 MG/DL (ref 0–1.2)
BUN SERPL-MCNC: 14 MG/DL (ref 6–24)
BUN/CREAT SERPL: 18 (ref 9–23)
CALCIUM SERPL-MCNC: 9.7 MG/DL (ref 8.7–10.2)
CHLORIDE SERPL-SCNC: 104 MMOL/L (ref 96–106)
CHOLEST SERPL-MCNC: 229 MG/DL (ref 100–199)
CO2 SERPL-SCNC: 22 MMOL/L (ref 20–29)
CREAT SERPL-MCNC: 0.78 MG/DL (ref 0.57–1)
EOSINOPHIL # BLD AUTO: 0 X10E3/UL (ref 0–0.4)
EOSINOPHIL NFR BLD AUTO: 1 %
ERYTHROCYTE [DISTWIDTH] IN BLOOD BY AUTOMATED COUNT: 12.6 % (ref 11.7–15.4)
GLOBULIN SER CALC-MCNC: 2.7 G/DL (ref 1.5–4.5)
GLUCOSE SERPL-MCNC: 93 MG/DL (ref 65–99)
HCT VFR BLD AUTO: 44 % (ref 34–46.6)
HDLC SERPL-MCNC: 55 MG/DL
HGB BLD-MCNC: 15.3 G/DL (ref 11.1–15.9)
IMM GRANULOCYTES # BLD AUTO: 0 X10E3/UL (ref 0–0.1)
IMM GRANULOCYTES NFR BLD AUTO: 0 %
LDLC SERPL CALC-MCNC: 155 MG/DL (ref 0–99)
LDLC/HDLC SERPL: 2.8 RATIO (ref 0–3.2)
LYMPHOCYTES # BLD AUTO: 1.7 X10E3/UL (ref 0.7–3.1)
LYMPHOCYTES NFR BLD AUTO: 28 %
MCH RBC QN AUTO: 32 PG (ref 26.6–33)
MCHC RBC AUTO-ENTMCNC: 34.8 G/DL (ref 31.5–35.7)
MCV RBC AUTO: 92 FL (ref 79–97)
MONOCYTES # BLD AUTO: 0.3 X10E3/UL (ref 0.1–0.9)
MONOCYTES NFR BLD AUTO: 6 %
NEUTROPHILS # BLD AUTO: 3.8 X10E3/UL (ref 1.4–7)
NEUTROPHILS NFR BLD AUTO: 65 %
PLATELET # BLD AUTO: 171 X10E3/UL (ref 150–450)
POTASSIUM SERPL-SCNC: 4.4 MMOL/L (ref 3.5–5.2)
PROT SERPL-MCNC: 7.3 G/DL (ref 6–8.5)
RBC # BLD AUTO: 4.78 X10E6/UL (ref 3.77–5.28)
SODIUM SERPL-SCNC: 140 MMOL/L (ref 134–144)
T3FREE SERPL-MCNC: 2.8 PG/ML (ref 2–4.4)
T4 FREE SERPL-MCNC: 1.91 NG/DL (ref 0.82–1.77)
TRIGL SERPL-MCNC: 107 MG/DL (ref 0–149)
TSH SERPL DL<=0.005 MIU/L-ACNC: 0.79 UIU/ML (ref 0.45–4.5)
VLDLC SERPL CALC-MCNC: 19 MG/DL (ref 5–40)
WBC # BLD AUTO: 5.9 X10E3/UL (ref 3.4–10.8)

## 2021-01-21 DIAGNOSIS — E89.0 POSTABLATIVE HYPOTHYROIDISM: ICD-10-CM

## 2021-01-21 DIAGNOSIS — N76.0 ACUTE VAGINITIS: Primary | ICD-10-CM

## 2021-01-21 RX ORDER — LEVOTHYROXINE SODIUM 0.1 MG/1
100 TABLET ORAL DAILY
Qty: 30 TABLET | Refills: 4 | Status: CANCELLED | OUTPATIENT
Start: 2021-01-21

## 2021-01-21 NOTE — TELEPHONE ENCOUNTER
Pt called requesting lab result and refill on synthroid also wanting to to know status of PA for metronidazole gel.

## 2021-01-22 DIAGNOSIS — E78.49 OTHER HYPERLIPIDEMIA: ICD-10-CM

## 2021-01-22 DIAGNOSIS — E89.0 POSTABLATIVE HYPOTHYROIDISM: ICD-10-CM

## 2021-01-22 DIAGNOSIS — E55.9 VITAMIN D DEFICIENCY: Primary | ICD-10-CM

## 2021-01-22 RX ORDER — LEVOTHYROXINE SODIUM 88 UG/1
88 TABLET ORAL DAILY
Qty: 30 TABLET | Refills: 3 | Status: SHIPPED | OUTPATIENT
Start: 2021-01-22 | End: 2021-05-24

## 2021-01-22 RX ORDER — EZETIMIBE 10 MG/1
10 TABLET ORAL DAILY
Qty: 90 TABLET | Refills: 1 | Status: SHIPPED | OUTPATIENT
Start: 2021-01-22 | End: 2021-07-23

## 2021-01-22 RX ORDER — ERGOCALCIFEROL 1.25 MG/1
50000 CAPSULE ORAL WEEKLY
Qty: 13 CAPSULE | Refills: 1 | Status: SHIPPED | OUTPATIENT
Start: 2021-01-22 | End: 2022-11-15

## 2021-01-22 NOTE — TELEPHONE ENCOUNTER
Called pharmacy to see what is covered and they could not give me a list they just said try something similiar

## 2021-01-22 NOTE — TELEPHONE ENCOUNTER
I have changed her medications per result note. I am unaware of PA for metrogel. I have never seen a PA for this- it is a standard dose. Please check on Rx.

## 2021-01-22 NOTE — TELEPHONE ENCOUNTER
I have never seen this medication denied. It is generic and the most commonly used medication. There is 1 other option. If they do not cover this, we need to contact her insurance to find out why this isn't covered.

## 2021-03-08 DIAGNOSIS — E89.0 POSTABLATIVE HYPOTHYROIDISM: Primary | ICD-10-CM

## 2021-03-18 LAB
T3FREE SERPL-MCNC: 2.4 PG/ML (ref 2–4.4)
T4 FREE SERPL-MCNC: 1.59 NG/DL (ref 0.93–1.7)
TSH SERPL DL<=0.005 MIU/L-ACNC: 3.06 UIU/ML (ref 0.27–4.2)

## 2021-03-18 RX ORDER — ATORVASTATIN CALCIUM 40 MG/1
TABLET, FILM COATED ORAL
Qty: 90 TABLET | Refills: 1 | Status: SHIPPED | OUTPATIENT
Start: 2021-03-18 | End: 2021-09-13

## 2021-03-19 ENCOUNTER — TELEPHONE (OUTPATIENT)
Dept: FAMILY MEDICINE CLINIC | Facility: CLINIC | Age: 56
End: 2021-03-19

## 2021-03-19 DIAGNOSIS — H65.01 RIGHT ACUTE SEROUS OTITIS MEDIA, RECURRENCE NOT SPECIFIED: ICD-10-CM

## 2021-03-19 DIAGNOSIS — R09.81 HEAD CONGESTION: Primary | ICD-10-CM

## 2021-03-19 DIAGNOSIS — J02.9 PHARYNGITIS, UNSPECIFIED ETIOLOGY: ICD-10-CM

## 2021-03-19 RX ORDER — FLUTICASONE PROPIONATE 50 MCG
2 SPRAY, SUSPENSION (ML) NASAL DAILY
Qty: 16 G | Refills: 0 | Status: SHIPPED | OUTPATIENT
Start: 2021-03-19 | End: 2022-11-15

## 2021-03-19 RX ORDER — GUAIFENESIN AND DEXTROMETHORPHAN HYDROBROMIDE 600; 30 MG/1; MG/1
1 TABLET, EXTENDED RELEASE ORAL 2 TIMES DAILY
Qty: 45 TABLET | Refills: 0 | Status: SHIPPED | OUTPATIENT
Start: 2021-03-19 | End: 2021-08-11

## 2021-03-19 RX ORDER — LORATADINE 10 MG/1
10 TABLET ORAL DAILY
Qty: 30 TABLET | Refills: 2 | Status: SHIPPED | OUTPATIENT
Start: 2021-03-19 | End: 2022-11-15

## 2021-03-19 NOTE — TELEPHONE ENCOUNTER
I sent medications for symptom treatment to the pharmacy.  She should let me know if she does not have resolution of symptoms or consider follow-up with strep testing or further evaluation.  To be seen ASAP if she develops cough, shortness of air, fever, systemic symptoms such as weakness, dizziness.

## 2021-03-19 NOTE — TELEPHONE ENCOUNTER
Pt called back states feels ok but has severe allergies and throat pain clear sinus drainage wants something called in no fever chills just a lot of sinus drainage

## 2021-05-24 DIAGNOSIS — E89.0 POSTABLATIVE HYPOTHYROIDISM: ICD-10-CM

## 2021-05-24 RX ORDER — LEVOTHYROXINE SODIUM 88 UG/1
TABLET ORAL
Qty: 90 TABLET | Refills: 0 | Status: SHIPPED | OUTPATIENT
Start: 2021-05-24 | End: 2021-08-26

## 2021-07-08 DIAGNOSIS — E55.9 VITAMIN D DEFICIENCY: ICD-10-CM

## 2021-07-09 RX ORDER — ERGOCALCIFEROL 1.25 MG/1
CAPSULE ORAL
Qty: 12 CAPSULE | Refills: 0 | OUTPATIENT
Start: 2021-07-09

## 2021-07-23 DIAGNOSIS — E78.49 OTHER HYPERLIPIDEMIA: ICD-10-CM

## 2021-07-23 RX ORDER — EZETIMIBE 10 MG/1
TABLET ORAL
Qty: 90 TABLET | Refills: 0 | Status: SHIPPED | OUTPATIENT
Start: 2021-07-23 | End: 2021-10-25

## 2021-07-27 ENCOUNTER — TELEPHONE (OUTPATIENT)
Dept: FAMILY MEDICINE CLINIC | Facility: CLINIC | Age: 56
End: 2021-07-27

## 2021-08-11 ENCOUNTER — OFFICE VISIT (OUTPATIENT)
Dept: FAMILY MEDICINE CLINIC | Facility: CLINIC | Age: 56
End: 2021-08-11

## 2021-08-11 VITALS
DIASTOLIC BLOOD PRESSURE: 80 MMHG | WEIGHT: 145 LBS | HEART RATE: 74 BPM | HEIGHT: 66 IN | BODY MASS INDEX: 23.3 KG/M2 | TEMPERATURE: 96.6 F | OXYGEN SATURATION: 96 % | SYSTOLIC BLOOD PRESSURE: 128 MMHG | RESPIRATION RATE: 18 BRPM

## 2021-08-11 DIAGNOSIS — R73.01 ELEVATED FASTING GLUCOSE: ICD-10-CM

## 2021-08-11 DIAGNOSIS — D35.1 PARATHYROID ADENOMA: ICD-10-CM

## 2021-08-11 DIAGNOSIS — E78.49 OTHER HYPERLIPIDEMIA: ICD-10-CM

## 2021-08-11 DIAGNOSIS — F43.21 GRIEF REACTION: ICD-10-CM

## 2021-08-11 DIAGNOSIS — E55.9 VITAMIN D DEFICIENCY: ICD-10-CM

## 2021-08-11 DIAGNOSIS — F43.23 ADJUSTMENT DISORDER WITH MIXED ANXIETY AND DEPRESSED MOOD: ICD-10-CM

## 2021-08-11 DIAGNOSIS — E89.0 POSTABLATIVE HYPOTHYROIDISM: Primary | ICD-10-CM

## 2021-08-11 PROCEDURE — 99214 OFFICE O/P EST MOD 30 MIN: CPT | Performed by: PHYSICIAN ASSISTANT

## 2021-08-12 LAB
25(OH)D3+25(OH)D2 SERPL-MCNC: 19.9 NG/ML (ref 30–100)
ALBUMIN SERPL-MCNC: 4.4 G/DL (ref 3.8–4.9)
ALBUMIN/GLOB SERPL: 1.6 {RATIO} (ref 1.2–2.2)
ALP SERPL-CCNC: 72 IU/L (ref 48–121)
ALT SERPL-CCNC: 12 IU/L (ref 0–32)
AST SERPL-CCNC: 17 IU/L (ref 0–40)
BASOPHILS # BLD AUTO: 0 X10E3/UL (ref 0–0.2)
BASOPHILS NFR BLD AUTO: 0 %
BILIRUB SERPL-MCNC: 0.7 MG/DL (ref 0–1.2)
BUN SERPL-MCNC: 12 MG/DL (ref 6–24)
BUN/CREAT SERPL: 15 (ref 9–23)
CALCIUM SERPL-MCNC: 9.6 MG/DL (ref 8.7–10.2)
CHLORIDE SERPL-SCNC: 103 MMOL/L (ref 96–106)
CHOLEST SERPL-MCNC: 250 MG/DL (ref 100–199)
CK SERPL-CCNC: 76 U/L (ref 32–182)
CO2 SERPL-SCNC: 24 MMOL/L (ref 20–29)
CREAT SERPL-MCNC: 0.81 MG/DL (ref 0.57–1)
EOSINOPHIL # BLD AUTO: 0 X10E3/UL (ref 0–0.4)
EOSINOPHIL NFR BLD AUTO: 1 %
ERYTHROCYTE [DISTWIDTH] IN BLOOD BY AUTOMATED COUNT: 12.5 % (ref 11.7–15.4)
GLOBULIN SER CALC-MCNC: 2.8 G/DL (ref 1.5–4.5)
GLUCOSE SERPL-MCNC: 79 MG/DL (ref 65–99)
HBA1C MFR BLD: 5.7 % (ref 4.8–5.6)
HCT VFR BLD AUTO: 45.4 % (ref 34–46.6)
HDLC SERPL-MCNC: 61 MG/DL
HGB BLD-MCNC: 15.3 G/DL (ref 11.1–15.9)
IMM GRANULOCYTES # BLD AUTO: 0 X10E3/UL (ref 0–0.1)
IMM GRANULOCYTES NFR BLD AUTO: 0 %
LDLC SERPL CALC-MCNC: 172 MG/DL (ref 0–99)
LDLC/HDLC SERPL: 2.8 RATIO (ref 0–3.2)
LYMPHOCYTES # BLD AUTO: 1.4 X10E3/UL (ref 0.7–3.1)
LYMPHOCYTES NFR BLD AUTO: 31 %
MCH RBC QN AUTO: 31.2 PG (ref 26.6–33)
MCHC RBC AUTO-ENTMCNC: 33.7 G/DL (ref 31.5–35.7)
MCV RBC AUTO: 93 FL (ref 79–97)
MONOCYTES # BLD AUTO: 0.3 X10E3/UL (ref 0.1–0.9)
MONOCYTES NFR BLD AUTO: 6 %
NEUTROPHILS # BLD AUTO: 2.9 X10E3/UL (ref 1.4–7)
NEUTROPHILS NFR BLD AUTO: 62 %
PLATELET # BLD AUTO: 164 X10E3/UL (ref 150–450)
POTASSIUM SERPL-SCNC: 4.4 MMOL/L (ref 3.5–5.2)
PROT SERPL-MCNC: 7.2 G/DL (ref 6–8.5)
PTH-INTACT SERPL-MCNC: 42 PG/ML (ref 15–65)
RBC # BLD AUTO: 4.9 X10E6/UL (ref 3.77–5.28)
SODIUM SERPL-SCNC: 140 MMOL/L (ref 134–144)
T3FREE SERPL-MCNC: 2.5 PG/ML (ref 2–4.4)
T4 FREE SERPL-MCNC: 1.63 NG/DL (ref 0.82–1.77)
TRIGL SERPL-MCNC: 100 MG/DL (ref 0–149)
TSH SERPL DL<=0.005 MIU/L-ACNC: 3.82 UIU/ML (ref 0.45–4.5)
VLDLC SERPL CALC-MCNC: 17 MG/DL (ref 5–40)
WBC # BLD AUTO: 4.6 X10E3/UL (ref 3.4–10.8)

## 2021-08-25 DIAGNOSIS — E89.0 POSTABLATIVE HYPOTHYROIDISM: ICD-10-CM

## 2021-08-26 RX ORDER — LEVOTHYROXINE SODIUM 88 UG/1
TABLET ORAL
Qty: 90 TABLET | Refills: 0 | Status: SHIPPED | OUTPATIENT
Start: 2021-08-26 | End: 2021-11-23

## 2021-09-13 RX ORDER — ATORVASTATIN CALCIUM 40 MG/1
TABLET, FILM COATED ORAL
Qty: 90 TABLET | Refills: 1 | Status: SHIPPED | OUTPATIENT
Start: 2021-09-13 | End: 2022-11-15

## 2021-10-24 DIAGNOSIS — E78.49 OTHER HYPERLIPIDEMIA: ICD-10-CM

## 2021-10-25 RX ORDER — EZETIMIBE 10 MG/1
TABLET ORAL
Qty: 30 TABLET | Refills: 1 | Status: SHIPPED | OUTPATIENT
Start: 2021-10-25 | End: 2021-12-27

## 2021-10-25 NOTE — TELEPHONE ENCOUNTER
Last ov 8/11/21    Last lab 8/11/21  Please schedule follow up with me in 12/2021 for 4 month follow up, fasting.

## 2021-11-23 DIAGNOSIS — E89.0 POSTABLATIVE HYPOTHYROIDISM: ICD-10-CM

## 2021-11-23 RX ORDER — LEVOTHYROXINE SODIUM 88 UG/1
TABLET ORAL
Qty: 90 TABLET | Refills: 0 | Status: SHIPPED | OUTPATIENT
Start: 2021-11-23 | End: 2022-03-03 | Stop reason: SDUPTHER

## 2021-12-26 DIAGNOSIS — E78.49 OTHER HYPERLIPIDEMIA: ICD-10-CM

## 2021-12-27 RX ORDER — EZETIMIBE 10 MG/1
TABLET ORAL
Qty: 30 TABLET | Refills: 0 | Status: SHIPPED | OUTPATIENT
Start: 2021-12-27 | End: 2022-01-31

## 2022-01-28 ENCOUNTER — OFFICE VISIT (OUTPATIENT)
Dept: FAMILY MEDICINE CLINIC | Facility: CLINIC | Age: 57
End: 2022-01-28

## 2022-01-28 VITALS
WEIGHT: 145 LBS | HEART RATE: 79 BPM | SYSTOLIC BLOOD PRESSURE: 110 MMHG | DIASTOLIC BLOOD PRESSURE: 62 MMHG | TEMPERATURE: 98 F | OXYGEN SATURATION: 99 % | RESPIRATION RATE: 12 BRPM | BODY MASS INDEX: 23.3 KG/M2 | HEIGHT: 66 IN

## 2022-01-28 DIAGNOSIS — E55.9 VITAMIN D DEFICIENCY: ICD-10-CM

## 2022-01-28 DIAGNOSIS — L53.9 ERYTHEMA OF LOWER EXTREMITY: ICD-10-CM

## 2022-01-28 DIAGNOSIS — R20.2 NUMBNESS AND TINGLING OF RIGHT LOWER EXTREMITY: ICD-10-CM

## 2022-01-28 DIAGNOSIS — E89.0 POSTABLATIVE HYPOTHYROIDISM: Primary | ICD-10-CM

## 2022-01-28 DIAGNOSIS — D35.1 PARATHYROID ADENOMA: ICD-10-CM

## 2022-01-28 DIAGNOSIS — R60.0 EDEMA OF RIGHT LOWER EXTREMITY: ICD-10-CM

## 2022-01-28 DIAGNOSIS — E78.49 OTHER HYPERLIPIDEMIA: ICD-10-CM

## 2022-01-28 DIAGNOSIS — R73.01 ELEVATED FASTING GLUCOSE: ICD-10-CM

## 2022-01-28 DIAGNOSIS — F43.23 ADJUSTMENT DISORDER WITH MIXED ANXIETY AND DEPRESSED MOOD: ICD-10-CM

## 2022-01-28 DIAGNOSIS — R20.0 NUMBNESS AND TINGLING OF RIGHT LOWER EXTREMITY: ICD-10-CM

## 2022-01-28 DIAGNOSIS — F43.21 GRIEF REACTION: ICD-10-CM

## 2022-01-28 PROCEDURE — 99214 OFFICE O/P EST MOD 30 MIN: CPT | Performed by: PHYSICIAN ASSISTANT

## 2022-01-28 RX ORDER — DOXYCYCLINE HYCLATE 100 MG/1
100 CAPSULE ORAL 2 TIMES DAILY
Qty: 20 CAPSULE | Refills: 0 | Status: SHIPPED | OUTPATIENT
Start: 2022-01-28 | End: 2022-11-15

## 2022-01-28 RX ORDER — VALACYCLOVIR HYDROCHLORIDE 1 G/1
1000 TABLET, FILM COATED ORAL 3 TIMES DAILY
Qty: 30 TABLET | Refills: 0 | Status: SHIPPED | OUTPATIENT
Start: 2022-01-28

## 2022-01-29 DIAGNOSIS — E78.49 OTHER HYPERLIPIDEMIA: ICD-10-CM

## 2022-01-29 LAB
25(OH)D3+25(OH)D2 SERPL-MCNC: 12.2 NG/ML (ref 30–100)
ALBUMIN SERPL-MCNC: 4.7 G/DL (ref 3.8–4.9)
ALBUMIN/GLOB SERPL: 1.7 {RATIO} (ref 1.2–2.2)
ALP SERPL-CCNC: 74 IU/L (ref 44–121)
ALT SERPL-CCNC: 12 IU/L (ref 0–32)
AST SERPL-CCNC: 17 IU/L (ref 0–40)
BASOPHILS # BLD AUTO: 0 X10E3/UL (ref 0–0.2)
BASOPHILS NFR BLD AUTO: 0 %
BILIRUB SERPL-MCNC: 0.6 MG/DL (ref 0–1.2)
BUN SERPL-MCNC: 13 MG/DL (ref 6–24)
BUN/CREAT SERPL: 14 (ref 9–23)
CALCIUM SERPL-MCNC: 9.7 MG/DL (ref 8.7–10.2)
CHLORIDE SERPL-SCNC: 105 MMOL/L (ref 96–106)
CHOLEST SERPL-MCNC: 239 MG/DL (ref 100–199)
CK SERPL-CCNC: 78 U/L (ref 32–182)
CO2 SERPL-SCNC: 25 MMOL/L (ref 20–29)
CREAT SERPL-MCNC: 0.9 MG/DL (ref 0.57–1)
EOSINOPHIL # BLD AUTO: 0 X10E3/UL (ref 0–0.4)
EOSINOPHIL NFR BLD AUTO: 1 %
ERYTHROCYTE [DISTWIDTH] IN BLOOD BY AUTOMATED COUNT: 12.3 % (ref 11.7–15.4)
GLOBULIN SER CALC-MCNC: 2.8 G/DL (ref 1.5–4.5)
GLUCOSE SERPL-MCNC: 85 MG/DL (ref 65–99)
HBA1C MFR BLD: 5.7 % (ref 4.8–5.6)
HCT VFR BLD AUTO: 43.2 % (ref 34–46.6)
HDLC SERPL-MCNC: 65 MG/DL
HGB BLD-MCNC: 14.8 G/DL (ref 11.1–15.9)
IMM GRANULOCYTES # BLD AUTO: 0 X10E3/UL (ref 0–0.1)
IMM GRANULOCYTES NFR BLD AUTO: 0 %
LDLC SERPL CALC-MCNC: 161 MG/DL (ref 0–99)
LDLC/HDLC SERPL: 2.5 RATIO (ref 0–3.2)
LYMPHOCYTES # BLD AUTO: 1.3 X10E3/UL (ref 0.7–3.1)
LYMPHOCYTES NFR BLD AUTO: 25 %
MCH RBC QN AUTO: 31.7 PG (ref 26.6–33)
MCHC RBC AUTO-ENTMCNC: 34.3 G/DL (ref 31.5–35.7)
MCV RBC AUTO: 93 FL (ref 79–97)
MONOCYTES # BLD AUTO: 0.3 X10E3/UL (ref 0.1–0.9)
MONOCYTES NFR BLD AUTO: 5 %
NEUTROPHILS # BLD AUTO: 3.6 X10E3/UL (ref 1.4–7)
NEUTROPHILS NFR BLD AUTO: 69 %
PLATELET # BLD AUTO: 188 X10E3/UL (ref 150–450)
POTASSIUM SERPL-SCNC: 4.3 MMOL/L (ref 3.5–5.2)
PROT SERPL-MCNC: 7.5 G/DL (ref 6–8.5)
PTH-INTACT SERPL-MCNC: 42 PG/ML (ref 15–65)
RBC # BLD AUTO: 4.67 X10E6/UL (ref 3.77–5.28)
SODIUM SERPL-SCNC: 142 MMOL/L (ref 134–144)
T3FREE SERPL-MCNC: 2.7 PG/ML (ref 2–4.4)
T4 FREE SERPL-MCNC: 1.58 NG/DL (ref 0.82–1.77)
TRIGL SERPL-MCNC: 78 MG/DL (ref 0–149)
TSH SERPL-ACNC: 6.42 UIU/ML (ref 0.45–4.5)
VLDLC SERPL CALC-MCNC: 13 MG/DL (ref 5–40)
WBC # BLD AUTO: 5.3 X10E3/UL (ref 3.4–10.8)

## 2022-01-31 RX ORDER — EZETIMIBE 10 MG/1
TABLET ORAL
Qty: 30 TABLET | Refills: 0 | Status: SHIPPED | OUTPATIENT
Start: 2022-01-31 | End: 2022-02-28

## 2022-02-03 DIAGNOSIS — R20.2 NUMBNESS AND TINGLING OF RIGHT LOWER EXTREMITY: ICD-10-CM

## 2022-02-03 DIAGNOSIS — R20.0 NUMBNESS AND TINGLING OF RIGHT LOWER EXTREMITY: ICD-10-CM

## 2022-02-03 DIAGNOSIS — L53.9 ERYTHEMA OF LOWER EXTREMITY: ICD-10-CM

## 2022-02-03 RX ORDER — VALACYCLOVIR HYDROCHLORIDE 1 G/1
TABLET, FILM COATED ORAL
Qty: 30 TABLET | Refills: 0 | OUTPATIENT
Start: 2022-02-03

## 2022-02-22 ENCOUNTER — HOSPITAL ENCOUNTER (OUTPATIENT)
Dept: CARDIOLOGY | Facility: HOSPITAL | Age: 57
Discharge: HOME | End: 2022-02-22

## 2022-02-22 DIAGNOSIS — R20.2 NUMBNESS AND TINGLING OF RIGHT LOWER EXTREMITY: ICD-10-CM

## 2022-02-22 DIAGNOSIS — R60.0 EDEMA OF RIGHT LOWER EXTREMITY: ICD-10-CM

## 2022-02-22 DIAGNOSIS — R20.0 NUMBNESS AND TINGLING OF RIGHT LOWER EXTREMITY: ICD-10-CM

## 2022-02-22 DIAGNOSIS — L53.9 ERYTHEMA OF LOWER EXTREMITY: ICD-10-CM

## 2022-02-22 LAB
BH CV LOWER ARTERIAL LEFT ABI RATIO: 1.1
BH CV LOWER ARTERIAL LEFT DORSALIS PEDIS SYS MAX: 120 MMHG
BH CV LOWER ARTERIAL LEFT GREAT TOE SYS MAX: 110 MMHG
BH CV LOWER ARTERIAL LEFT POST TIBIAL SYS MAX: 138 MMHG
BH CV LOWER ARTERIAL LEFT TBI RATIO: 0.88
BH CV LOWER ARTERIAL RIGHT ABI RATIO: 0.99
BH CV LOWER ARTERIAL RIGHT DORSALIS PEDIS SYS MAX: 120 MMHG
BH CV LOWER ARTERIAL RIGHT GREAT TOE SYS MAX: 119 MMHG
BH CV LOWER ARTERIAL RIGHT POST TIBIAL SYS MAX: 124 MMHG
BH CV LOWER ARTERIAL RIGHT TBI RATIO: 0.95
BH CV LOWER VASCULAR LEFT COMMON FEMORAL AUGMENT: NORMAL
BH CV LOWER VASCULAR LEFT COMMON FEMORAL COMPETENT: NORMAL
BH CV LOWER VASCULAR LEFT COMMON FEMORAL COMPRESS: NORMAL
BH CV LOWER VASCULAR LEFT COMMON FEMORAL PHASIC: NORMAL
BH CV LOWER VASCULAR LEFT COMMON FEMORAL SPONT: NORMAL
BH CV LOWER VASCULAR RIGHT COMMON FEMORAL AUGMENT: NORMAL
BH CV LOWER VASCULAR RIGHT COMMON FEMORAL COMPETENT: NORMAL
BH CV LOWER VASCULAR RIGHT COMMON FEMORAL COMPRESS: NORMAL
BH CV LOWER VASCULAR RIGHT COMMON FEMORAL PHASIC: NORMAL
BH CV LOWER VASCULAR RIGHT COMMON FEMORAL SPONT: NORMAL
BH CV LOWER VASCULAR RIGHT DISTAL FEMORAL COMPRESS: NORMAL
BH CV LOWER VASCULAR RIGHT GASTRONEMIUS COMPRESS: NORMAL
BH CV LOWER VASCULAR RIGHT GREATER SAPH AK COMPRESS: NORMAL
BH CV LOWER VASCULAR RIGHT GREATER SAPH BK COMPRESS: NORMAL
BH CV LOWER VASCULAR RIGHT LESSER SAPH COMPRESS: NORMAL
BH CV LOWER VASCULAR RIGHT MID FEMORAL AUGMENT: NORMAL
BH CV LOWER VASCULAR RIGHT MID FEMORAL COMPETENT: NORMAL
BH CV LOWER VASCULAR RIGHT MID FEMORAL COMPRESS: NORMAL
BH CV LOWER VASCULAR RIGHT MID FEMORAL PHASIC: NORMAL
BH CV LOWER VASCULAR RIGHT MID FEMORAL SPONT: NORMAL
BH CV LOWER VASCULAR RIGHT PERONEAL COMPRESS: NORMAL
BH CV LOWER VASCULAR RIGHT POPLITEAL AUGMENT: NORMAL
BH CV LOWER VASCULAR RIGHT POPLITEAL COMPETENT: NORMAL
BH CV LOWER VASCULAR RIGHT POPLITEAL COMPRESS: NORMAL
BH CV LOWER VASCULAR RIGHT POPLITEAL PHASIC: NORMAL
BH CV LOWER VASCULAR RIGHT POPLITEAL SPONT: NORMAL
BH CV LOWER VASCULAR RIGHT POSTERIOR TIBIAL COMPRESS: NORMAL
BH CV LOWER VASCULAR RIGHT PROFUNDA FEMORAL COMPRESS: NORMAL
BH CV LOWER VASCULAR RIGHT PROXIMAL FEMORAL COMPRESS: NORMAL
BH CV LOWER VASCULAR RIGHT SAPHENOFEMORAL JUNCTION COMPRESS: NORMAL
MAXIMAL PREDICTED HEART RATE: 166 BPM
MAXIMAL PREDICTED HEART RATE: 166 BPM
STRESS TARGET HR: 141 BPM
STRESS TARGET HR: 141 BPM
UPPER ARTERIAL LEFT ARM BRACHIAL SYS MAX: 117 MMHG
UPPER ARTERIAL RIGHT ARM BRACHIAL SYS MAX: 125 MMHG

## 2022-02-22 PROCEDURE — 93971 EXTREMITY STUDY: CPT

## 2022-02-22 PROCEDURE — 93922 UPR/L XTREMITY ART 2 LEVELS: CPT

## 2022-02-28 DIAGNOSIS — E78.49 OTHER HYPERLIPIDEMIA: ICD-10-CM

## 2022-02-28 RX ORDER — EZETIMIBE 10 MG/1
TABLET ORAL
Qty: 30 TABLET | Refills: 3 | Status: SHIPPED | OUTPATIENT
Start: 2022-02-28 | End: 2022-06-28

## 2022-02-28 NOTE — TELEPHONE ENCOUNTER
Last ov 1/28/22    Patient will have fasting labs. Call if no results in 1 week. Stability of conditions, plan, follow up, and further recommendations pending labs. If stable, follow up in 3-6 months.

## 2022-03-03 DIAGNOSIS — E89.0 POSTABLATIVE HYPOTHYROIDISM: ICD-10-CM

## 2022-03-03 RX ORDER — LEVOTHYROXINE SODIUM 88 UG/1
88 TABLET ORAL DAILY
Qty: 90 TABLET | Refills: 0 | Status: SHIPPED | OUTPATIENT
Start: 2022-03-03 | End: 2022-05-31

## 2022-05-10 NOTE — TELEPHONE ENCOUNTER
Recent labs in August not yet resulted. Unsure if this dosing will remain the same    Please review and refill or advice changes    Thank you  Aisha  
Home

## 2022-05-28 DIAGNOSIS — E89.0 POSTABLATIVE HYPOTHYROIDISM: ICD-10-CM

## 2022-05-31 RX ORDER — LEVOTHYROXINE SODIUM 88 UG/1
TABLET ORAL
Qty: 90 TABLET | Refills: 0 | Status: SHIPPED | OUTPATIENT
Start: 2022-05-31 | End: 2022-08-30

## 2022-06-06 ENCOUNTER — OFFICE VISIT (OUTPATIENT)
Dept: FAMILY MEDICINE CLINIC | Facility: CLINIC | Age: 57
End: 2022-06-06

## 2022-06-06 VITALS
BODY MASS INDEX: 23.46 KG/M2 | SYSTOLIC BLOOD PRESSURE: 108 MMHG | OXYGEN SATURATION: 97 % | HEIGHT: 66 IN | HEART RATE: 76 BPM | DIASTOLIC BLOOD PRESSURE: 78 MMHG | TEMPERATURE: 98.6 F | WEIGHT: 146 LBS

## 2022-06-06 DIAGNOSIS — H60.502 ACUTE OTITIS EXTERNA OF LEFT EAR, UNSPECIFIED TYPE: Primary | ICD-10-CM

## 2022-06-06 DIAGNOSIS — E89.0 POSTABLATIVE HYPOTHYROIDISM: ICD-10-CM

## 2022-06-06 PROCEDURE — 99214 OFFICE O/P EST MOD 30 MIN: CPT | Performed by: NURSE PRACTITIONER

## 2022-06-06 RX ORDER — CIPROFLOXACIN AND DEXAMETHASONE 3; 1 MG/ML; MG/ML
4 SUSPENSION/ DROPS AURICULAR (OTIC) 2 TIMES DAILY
Qty: 7.5 ML | Refills: 0 | Status: SHIPPED | OUTPATIENT
Start: 2022-06-06 | End: 2022-11-15

## 2022-06-06 NOTE — PROGRESS NOTES
"Chief Complaint  Pain (Knots on left side her of neck )    Subjective        Isacc Aldridge presents to [facility]  History of Present Illness  This is a 57 yo female patient here today for evaluation of enlarged lymph nodes on side of next and hypothyroidism. Patient reports noticing 2 nodules on side of left neck 1 week ago with ear pain. She reports the area have got down but still came in. She does not report here pain today. She denies fever or chills. She reports her energy has improved and eating more with weight increased. Her TSH was elevated at last visit in January and synthroid was adjusted. She did not return for follow up TSH check. She is currently on 88mcg daily.      Objective   Vital Signs:  /78   Pulse 76   Temp 98.6 °F (37 °C) (Infrared)   Ht 167.6 cm (65.98\")   Wt 66.2 kg (146 lb)   SpO2 97%   BMI 23.58 kg/m²     BMI is within normal parameters. No other follow-up for BMI required.      Physical Exam  Vitals and nursing note reviewed.   Constitutional:       Appearance: Normal appearance.   HENT:      Head: Normocephalic and atraumatic.      Right Ear: Tympanic membrane normal.      Left Ear: Swelling present. No tenderness. Tympanic membrane is not erythematous.   Musculoskeletal:      Cervical back: Neck supple. No tenderness.   Lymphadenopathy:      Cervical: Cervical adenopathy present.      Left cervical: Deep cervical adenopathy present.   Neurological:      Mental Status: She is alert.        Result Review :                Assessment and Plan   Diagnoses and all orders for this visit:    1. Acute otitis externa of left ear, unspecified type (Primary)  -     ciprofloxacin-dexamethasone (Ciprodex) 0.3-0.1 % otic suspension; Administer 4 drops into the left ear 2 (Two) Times a Day.  Dispense: 7.5 mL; Refill: 0    2. Postablative hypothyroidism  -     TSH  -     T4, Free    She does have small palpable cervical lymph node along with swelling in her " left ear canal.   I will give her ciprodex and she will return if symptoms do not improve  Her TSH was not rechecked so I will repeat that today. She is due for 6 month visit with neelam next month so I encouraged her to return to follow up with her for evaluation of chronic health concerns.                  Follow Up   No follow-ups on file.  Patient was given instructions and counseling regarding her condition or for health maintenance advice. Please see specific information pulled into the AVS if appropriate.

## 2022-06-07 LAB
T4 FREE SERPL-MCNC: 1.74 NG/DL (ref 0.82–1.77)
TSH SERPL DL<=0.005 MIU/L-ACNC: 4.61 UIU/ML (ref 0.45–4.5)

## 2022-06-24 ENCOUNTER — TELEPHONE (OUTPATIENT)
Dept: FAMILY MEDICINE CLINIC | Facility: CLINIC | Age: 57
End: 2022-06-24

## 2022-06-24 NOTE — TELEPHONE ENCOUNTER
Please ensure she is seen if she has any concerns. This would be my recommendations.     Patient to obtain pulse oximeter and monitor oxygen and pulse closely at rest, with ambulation, and if possible, have someone monitor occasionally while sleeping. To ER ASAP if any oxygen saturation less than 90%, persistent tachycardia, or if worsening respiratory symptoms or systemic symptoms- uncontrolled fever, weakness, dizziness, confusion or mental status changes. Treat symptoms- to use Mucinex DM twice daily, Nasacort or Flonase 2 sprays in each nostril once daily and Claritin or Zyrtec 10 mg once daily if nasal congestion. Avoid decongestants to avoid worsening tachycardia. Tylenol as needed for fever and ensure proper hydration. To be seen if no improvement, worsening, new, or changing symptoms or if no resolution of symptoms.

## 2022-06-24 NOTE — TELEPHONE ENCOUNTER
PATIENT CALLING TO LET OFFICE KNOW SHE WAS DIGINOSE WITH COVID AT [facility] IN [place]       CALLBACK # [phone]

## 2022-06-28 DIAGNOSIS — E78.49 OTHER HYPERLIPIDEMIA: ICD-10-CM

## 2022-06-28 RX ORDER — EZETIMIBE 10 MG/1
TABLET ORAL
Qty: 30 TABLET | Refills: 0 | Status: SHIPPED | OUTPATIENT
Start: 2022-06-28 | End: 2022-07-25

## 2022-07-25 DIAGNOSIS — E78.49 OTHER HYPERLIPIDEMIA: ICD-10-CM

## 2022-07-25 RX ORDER — EZETIMIBE 10 MG/1
TABLET ORAL
Qty: 30 TABLET | Refills: 0 | Status: SHIPPED | OUTPATIENT
Start: 2022-07-25 | End: 2022-08-22

## 2022-08-22 DIAGNOSIS — E78.49 OTHER HYPERLIPIDEMIA: ICD-10-CM

## 2022-08-22 RX ORDER — EZETIMIBE 10 MG/1
TABLET ORAL
Qty: 30 TABLET | Refills: 0 | Status: SHIPPED | OUTPATIENT
Start: 2022-08-22 | End: 2022-09-19

## 2022-08-25 DIAGNOSIS — E89.0 POSTABLATIVE HYPOTHYROIDISM: ICD-10-CM

## 2022-08-30 RX ORDER — LEVOTHYROXINE SODIUM 88 UG/1
TABLET ORAL
Qty: 30 TABLET | Refills: 0 | Status: SHIPPED | OUTPATIENT
Start: 2022-08-30 | End: 2022-09-26

## 2022-08-30 NOTE — TELEPHONE ENCOUNTER
CALLED PT, BUT PT DID NOT ANSWER. LEFT VM TO CALL BACK TO GET SCHEDULED FOR FOLLOW UP FASTING.  PLEASE ADVISE    HUB OKAY TO READ

## 2022-09-17 DIAGNOSIS — E78.49 OTHER HYPERLIPIDEMIA: ICD-10-CM

## 2022-09-19 RX ORDER — EZETIMIBE 10 MG/1
TABLET ORAL
Qty: 30 TABLET | Refills: 0 | Status: SHIPPED | OUTPATIENT
Start: 2022-09-19 | End: 2022-10-17

## 2022-09-25 DIAGNOSIS — E89.0 POSTABLATIVE HYPOTHYROIDISM: ICD-10-CM

## 2022-09-26 RX ORDER — LEVOTHYROXINE SODIUM 88 UG/1
TABLET ORAL
Qty: 30 TABLET | Refills: 0 | Status: SHIPPED | OUTPATIENT
Start: 2022-09-26 | End: 2022-10-24

## 2022-10-17 DIAGNOSIS — E78.49 OTHER HYPERLIPIDEMIA: ICD-10-CM

## 2022-10-17 RX ORDER — EZETIMIBE 10 MG/1
TABLET ORAL
Qty: 30 TABLET | Refills: 0 | Status: SHIPPED | OUTPATIENT
Start: 2022-10-17 | End: 2022-11-15

## 2022-10-23 DIAGNOSIS — E89.0 POSTABLATIVE HYPOTHYROIDISM: ICD-10-CM

## 2022-10-24 RX ORDER — LEVOTHYROXINE SODIUM 88 UG/1
TABLET ORAL
Qty: 30 TABLET | Refills: 0 | Status: SHIPPED | OUTPATIENT
Start: 2022-10-24 | End: 2022-11-21

## 2022-11-15 ENCOUNTER — OFFICE VISIT (OUTPATIENT)
Dept: FAMILY MEDICINE CLINIC | Facility: CLINIC | Age: 57
End: 2022-11-15

## 2022-11-15 VITALS
SYSTOLIC BLOOD PRESSURE: 118 MMHG | HEIGHT: 66 IN | WEIGHT: 143 LBS | HEART RATE: 78 BPM | OXYGEN SATURATION: 99 % | BODY MASS INDEX: 22.98 KG/M2 | DIASTOLIC BLOOD PRESSURE: 80 MMHG | TEMPERATURE: 98 F | RESPIRATION RATE: 16 BRPM

## 2022-11-15 DIAGNOSIS — E89.0 POSTABLATIVE HYPOTHYROIDISM: ICD-10-CM

## 2022-11-15 DIAGNOSIS — E78.49 OTHER HYPERLIPIDEMIA: ICD-10-CM

## 2022-11-15 DIAGNOSIS — R59.1 LYMPHADENOPATHY OF HEAD AND NECK: ICD-10-CM

## 2022-11-15 DIAGNOSIS — R73.01 ELEVATED FASTING GLUCOSE: ICD-10-CM

## 2022-11-15 DIAGNOSIS — E55.9 VITAMIN D DEFICIENCY: ICD-10-CM

## 2022-11-15 DIAGNOSIS — E78.49 OTHER HYPERLIPIDEMIA: Primary | ICD-10-CM

## 2022-11-15 DIAGNOSIS — D35.1 PARATHYROID ADENOMA: ICD-10-CM

## 2022-11-15 PROCEDURE — 99214 OFFICE O/P EST MOD 30 MIN: CPT | Performed by: PHYSICIAN ASSISTANT

## 2022-11-15 RX ORDER — EZETIMIBE 10 MG/1
TABLET ORAL
Qty: 30 TABLET | Refills: 0 | Status: SHIPPED | OUTPATIENT
Start: 2022-11-15 | End: 2022-12-12 | Stop reason: SDUPTHER

## 2022-11-18 LAB
25(OH)D3+25(OH)D2 SERPL-MCNC: 21.8 NG/ML (ref 30–100)
ALBUMIN SERPL-MCNC: 4.6 G/DL (ref 3.8–4.9)
ALBUMIN/GLOB SERPL: 1.8 {RATIO} (ref 1.2–2.2)
ALP SERPL-CCNC: 71 IU/L (ref 44–121)
ALT SERPL-CCNC: 24 IU/L (ref 0–32)
APPEARANCE UR: ABNORMAL
AST SERPL-CCNC: 22 IU/L (ref 0–40)
BACTERIA #/AREA URNS HPF: ABNORMAL /[HPF]
BACTERIA UR CULT: ABNORMAL
BASOPHILS # BLD AUTO: 0 X10E3/UL (ref 0–0.2)
BASOPHILS NFR BLD AUTO: 0 %
BILIRUB SERPL-MCNC: 0.4 MG/DL (ref 0–1.2)
BILIRUB UR QL STRIP: NEGATIVE
BUN SERPL-MCNC: 14 MG/DL (ref 6–24)
BUN/CREAT SERPL: 15 (ref 9–23)
CALCIUM SERPL-MCNC: 9.5 MG/DL (ref 8.7–10.2)
CASTS URNS QL MICRO: ABNORMAL /LPF
CHLORIDE SERPL-SCNC: 104 MMOL/L (ref 96–106)
CHOLEST SERPL-MCNC: 255 MG/DL (ref 100–199)
CK SERPL-CCNC: 73 U/L (ref 32–182)
CO2 SERPL-SCNC: 25 MMOL/L (ref 20–29)
COLOR UR: YELLOW
CREAT SERPL-MCNC: 0.91 MG/DL (ref 0.57–1)
EGFRCR SERPLBLD CKD-EPI 2021: 75 ML/MIN/1.73
EOSINOPHIL # BLD AUTO: 0.1 X10E3/UL (ref 0–0.4)
EOSINOPHIL NFR BLD AUTO: 2 %
EPI CELLS #/AREA URNS HPF: ABNORMAL /HPF (ref 0–10)
ERYTHROCYTE [DISTWIDTH] IN BLOOD BY AUTOMATED COUNT: 12.4 % (ref 11.7–15.4)
GLOBULIN SER CALC-MCNC: 2.6 G/DL (ref 1.5–4.5)
GLUCOSE SERPL-MCNC: 99 MG/DL (ref 70–99)
GLUCOSE UR QL STRIP: NEGATIVE
HBA1C MFR BLD: 5.9 % (ref 4.8–5.6)
HCT VFR BLD AUTO: 44.5 % (ref 34–46.6)
HDLC SERPL-MCNC: 60 MG/DL
HGB BLD-MCNC: 15.2 G/DL (ref 11.1–15.9)
HGB UR QL STRIP: ABNORMAL
IMM GRANULOCYTES # BLD AUTO: 0.1 X10E3/UL (ref 0–0.1)
IMM GRANULOCYTES NFR BLD AUTO: 1 %
KETONES UR QL STRIP: NEGATIVE
LDLC SERPL CALC-MCNC: 176 MG/DL (ref 0–99)
LDLC/HDLC SERPL: 2.9 RATIO (ref 0–3.2)
LEUKOCYTE ESTERASE UR QL STRIP: ABNORMAL
LYMPHOCYTES # BLD AUTO: 1.8 X10E3/UL (ref 0.7–3.1)
LYMPHOCYTES NFR BLD AUTO: 38 %
MCH RBC QN AUTO: 31.7 PG (ref 26.6–33)
MCHC RBC AUTO-ENTMCNC: 34.2 G/DL (ref 31.5–35.7)
MCV RBC AUTO: 93 FL (ref 79–97)
MICRO URNS: ABNORMAL
MONOCYTES # BLD AUTO: 0.3 X10E3/UL (ref 0.1–0.9)
MONOCYTES NFR BLD AUTO: 7 %
NEUTROPHILS # BLD AUTO: 2.4 X10E3/UL (ref 1.4–7)
NEUTROPHILS NFR BLD AUTO: 52 %
NITRITE UR QL STRIP: NEGATIVE
OTHER ANTIBIOTIC SUSC ISLT: ABNORMAL
PH UR STRIP: 5.5 [PH] (ref 5–7.5)
PLATELET # BLD AUTO: 176 X10E3/UL (ref 150–450)
POTASSIUM SERPL-SCNC: 4.2 MMOL/L (ref 3.5–5.2)
PROT SERPL-MCNC: 7.2 G/DL (ref 6–8.5)
PROT UR QL STRIP: NEGATIVE
RBC # BLD AUTO: 4.79 X10E6/UL (ref 3.77–5.28)
RBC #/AREA URNS HPF: ABNORMAL /HPF (ref 0–2)
SODIUM SERPL-SCNC: 142 MMOL/L (ref 134–144)
SP GR UR STRIP: 1.02 (ref 1–1.03)
T3FREE SERPL-MCNC: 2.6 PG/ML (ref 2–4.4)
T4 FREE SERPL-MCNC: 1.23 NG/DL (ref 0.82–1.77)
TRIGL SERPL-MCNC: 110 MG/DL (ref 0–149)
TSH SERPL DL<=0.005 MIU/L-ACNC: 7.58 UIU/ML (ref 0.45–4.5)
URINALYSIS REFLEX: ABNORMAL
UROBILINOGEN UR STRIP-MCNC: 0.2 MG/DL (ref 0.2–1)
VLDLC SERPL CALC-MCNC: 19 MG/DL (ref 5–40)
WBC # BLD AUTO: 4.7 X10E3/UL (ref 3.4–10.8)
WBC #/AREA URNS HPF: ABNORMAL /HPF (ref 0–5)

## 2022-11-19 DIAGNOSIS — E89.0 POSTABLATIVE HYPOTHYROIDISM: ICD-10-CM

## 2022-11-21 RX ORDER — LEVOTHYROXINE SODIUM 88 UG/1
TABLET ORAL
Qty: 30 TABLET | Refills: 3 | Status: SHIPPED | OUTPATIENT
Start: 2022-11-21 | End: 2022-12-12 | Stop reason: SDUPTHER

## 2022-11-30 ENCOUNTER — HOSPITAL ENCOUNTER (OUTPATIENT)
Dept: CT IMAGING | Facility: HOSPITAL | Age: 57
Discharge: HOME | End: 2022-11-30
Admitting: PHYSICIAN ASSISTANT

## 2022-11-30 DIAGNOSIS — D35.1 PARATHYROID ADENOMA: ICD-10-CM

## 2022-11-30 DIAGNOSIS — R59.1 LYMPHADENOPATHY OF HEAD AND NECK: ICD-10-CM

## 2022-11-30 PROCEDURE — 70491 CT SOFT TISSUE NECK W/DYE: CPT

## 2022-11-30 PROCEDURE — 25010000002 IOPAMIDOL 61 % SOLUTION: Performed by: PHYSICIAN ASSISTANT

## 2022-11-30 RX ADMIN — IOPAMIDOL 100 ML: 612 INJECTION, SOLUTION INTRAVENOUS at 08:10

## 2022-12-12 ENCOUNTER — TELEPHONE (OUTPATIENT)
Dept: FAMILY MEDICINE CLINIC | Facility: CLINIC | Age: 57
End: 2022-12-12

## 2022-12-12 DIAGNOSIS — R31.9 HEMATURIA, UNSPECIFIED TYPE: Primary | ICD-10-CM

## 2022-12-12 DIAGNOSIS — R59.1 LYMPHADENOPATHY OF HEAD AND NECK: ICD-10-CM

## 2022-12-12 DIAGNOSIS — E89.0 POSTABLATIVE HYPOTHYROIDISM: ICD-10-CM

## 2022-12-12 DIAGNOSIS — J98.59 MEDIASTINAL ABNORMALITY: ICD-10-CM

## 2022-12-12 DIAGNOSIS — D35.1 PARATHYROID ADENOMA: Primary | ICD-10-CM

## 2022-12-12 DIAGNOSIS — E78.49 OTHER HYPERLIPIDEMIA: ICD-10-CM

## 2022-12-12 RX ORDER — EZETIMIBE 10 MG/1
10 TABLET ORAL DAILY
Qty: 90 TABLET | Refills: 0 | Status: SHIPPED | OUTPATIENT
Start: 2022-12-12 | End: 2022-12-22

## 2022-12-12 RX ORDER — ATORVASTATIN CALCIUM 40 MG/1
40 TABLET, FILM COATED ORAL NIGHTLY
Qty: 90 TABLET | Refills: 0 | Status: SHIPPED | OUTPATIENT
Start: 2022-12-12 | End: 2023-03-13

## 2022-12-12 RX ORDER — LEVOTHYROXINE SODIUM 0.1 MG/1
100 TABLET ORAL DAILY
Qty: 90 TABLET | Refills: 0 | Status: SHIPPED | OUTPATIENT
Start: 2022-12-12 | End: 2023-03-17 | Stop reason: SDUPTHER

## 2022-12-12 RX ORDER — NITROFURANTOIN 25; 75 MG/1; MG/1
100 CAPSULE ORAL 2 TIMES DAILY
Qty: 14 CAPSULE | Refills: 0 | Status: SHIPPED | OUTPATIENT
Start: 2022-12-12

## 2022-12-12 NOTE — TELEPHONE ENCOUNTER
See result note-she had a known mass and did not follow-up with ENT.  I am recommending she go back to ENT for recommendations.  She also needs a CT chest

## 2022-12-12 NOTE — TELEPHONE ENCOUNTER
Called Pt with Lab results and she wanted to know CT results. I don't see that resulted yet    Janay

## 2022-12-22 DIAGNOSIS — E78.49 OTHER HYPERLIPIDEMIA: ICD-10-CM

## 2022-12-22 RX ORDER — EZETIMIBE 10 MG/1
TABLET ORAL
Qty: 30 TABLET | Refills: 3 | Status: SHIPPED | OUTPATIENT
Start: 2022-12-22

## 2022-12-22 NOTE — TELEPHONE ENCOUNTER
ezetimibe (ZETIA) 10 MG tablet        Sig: Take 1 tablet by mouth Daily.        Sent to pharmacy as: Ezetimibe 10 MG Oral Tablet (ZETIA)        Class: Normal        Route: Oral        E-Prescribing Status: Receipt confirmed by pharmacy (12/12/2022  5:43 AM EST)

## 2023-01-03 ENCOUNTER — TELEPHONE (OUTPATIENT)
Dept: FAMILY MEDICINE CLINIC | Facility: CLINIC | Age: 58
End: 2023-01-03
Payer: COMMERCIAL

## 2023-01-25 ENCOUNTER — APPOINTMENT (OUTPATIENT)
Dept: CT IMAGING | Facility: HOSPITAL | Age: 58
End: 2023-01-25
Payer: COMMERCIAL

## 2023-03-08 ENCOUNTER — OFFICE VISIT (OUTPATIENT)
Dept: FAMILY MEDICINE CLINIC | Facility: CLINIC | Age: 58
End: 2023-03-08
Payer: COMMERCIAL

## 2023-03-08 VITALS
OXYGEN SATURATION: 98 % | WEIGHT: 146 LBS | BODY MASS INDEX: 23.46 KG/M2 | SYSTOLIC BLOOD PRESSURE: 106 MMHG | HEART RATE: 74 BPM | HEIGHT: 66 IN | TEMPERATURE: 97.1 F | DIASTOLIC BLOOD PRESSURE: 78 MMHG

## 2023-03-08 DIAGNOSIS — R73.01 ELEVATED FASTING GLUCOSE: ICD-10-CM

## 2023-03-08 DIAGNOSIS — E78.49 OTHER HYPERLIPIDEMIA: ICD-10-CM

## 2023-03-08 DIAGNOSIS — D35.1 PARATHYROID ADENOMA: Primary | ICD-10-CM

## 2023-03-08 DIAGNOSIS — R31.9 HEMATURIA, UNSPECIFIED TYPE: ICD-10-CM

## 2023-03-08 DIAGNOSIS — R73.03 PREDIABETES: ICD-10-CM

## 2023-03-08 DIAGNOSIS — E55.9 VITAMIN D DEFICIENCY: ICD-10-CM

## 2023-03-08 DIAGNOSIS — J98.59 MEDIASTINAL ABNORMALITY: ICD-10-CM

## 2023-03-08 DIAGNOSIS — R59.1 LYMPHADENOPATHY OF HEAD AND NECK: ICD-10-CM

## 2023-03-08 DIAGNOSIS — E89.0 POSTABLATIVE HYPOTHYROIDISM: ICD-10-CM

## 2023-03-08 PROBLEM — F17.200 TOBACCO DEPENDENCE: Status: ACTIVE | Noted: 2023-02-01

## 2023-03-08 PROBLEM — F41.1 GENERALIZED ANXIETY DISORDER: Status: ACTIVE | Noted: 2023-02-01

## 2023-03-08 PROBLEM — F32.9 MAJOR DEPRESSION, SINGLE EPISODE: Status: ACTIVE | Noted: 2021-08-03

## 2023-03-08 PROBLEM — R59.0 LYMPHADENOPATHY OF HEAD AND NECK REGION: Status: ACTIVE | Noted: 2023-02-01

## 2023-03-08 PROBLEM — Z00.00 ROUTINE HEALTH MAINTENANCE: Status: ACTIVE | Noted: 2023-02-01

## 2023-03-08 PROBLEM — E78.5 HYPERLIPIDEMIA, UNSPECIFIED: Status: ACTIVE | Noted: 2019-02-06

## 2023-03-08 PROCEDURE — 99214 OFFICE O/P EST MOD 30 MIN: CPT | Performed by: PHYSICIAN ASSISTANT

## 2023-03-08 NOTE — PROGRESS NOTES
Subjective   Isacc Aldridge is a 57 y.o. female who presents today for 4-month follow-up of hypothyroidism, hyperlipidemia, vitamin D deficiency, hyperparathyroidism, and moods.  She also has an area on her thigh that is painful and cold.    Hypothyroidism  Associated symptoms include arthralgias, fatigue and myalgias. Pertinent negatives include no vomiting.   Earache   Pertinent negatives include no diarrhea or vomiting.   Hemorrhoids  Associated symptoms include arthralgias, fatigue and myalgias. Pertinent negatives include no vomiting.   Hyperlipidemia  Exacerbating diseases include hypothyroidism. Associated symptoms include myalgias.      She had an injury at work. Walking up the ramp and was hit by a promise. She reported and they advised she go to [facility]. She went and they could not see her. She had to go back the next day. They did xray and offered medication for pain. She did not want pain medication. They gave note for light duty. They had her mopping the whole floor. She went back and asked them to release her from workman's comp injury so she did not lose her job. She then returned to work.     She was on the ramp trying to pull a promise and when she jerked the promise, she fell backward on the pavement and hit her right elbow.           Patient has an area on the lateral right thigh that is cold to touch and gets sore.  No injury that she is aware.  No pain, numbness, or tingling down the leg or in any other area.  She reports this is a circular area in the proximal, lateral thigh.     Hypothyroidism- taking levothyroxine 88 mcg daily  Previously, her thyroid was overmedicated on levothyroxine 112 mcg daily, so we decreased to levothyroxine 100 mcg daily then again to 88 mcg daily. She feels overwhelmed at work but no other symptoms of hyperthyroidism/ hypothyroidism.   Hyperlipidemia- does not think she is taking atorvastatin Lipitor at bedtime. Previously tolerated without AE. Started Zetia 1/2021 as  "directed. Taking daily.   Elevated fasting glucose- patient is on no medications. No significant dietary changes.    Vitamin D deficiency- not taking vitamin D   no longer taking vitamin D 50,000 IU once weekly. She was taking but has not been taking regularly.   Parathyroid adenoma- she is not following with ENT. She was seen by ENT who recommend parathyroid mass biopsy. She declined and does not return for follow-up.  I encouraged her to return to ENT for follow-up and have discussed risks of untreated parathyroid mass.      Moods- have improved with counseling. She does feel this is helpful. Patient has had to miss a couple of virtual appts due to getting called into work. She is working all the time. However, therapy is helping with her loss/grief and moods overall. Now she is dealing with her father who is not doing as well. She is having to go feed him and work. Siblings cannot help. No on to help. She was trying to take care of him. Concern with inpatient care that no one will feed him and he will not eat. She was having to go to the hospital to feed him but it was a long drive.  She also has increased stressors with work and not having much help.  · Her sister  in 2020 then her son  in MVA at age 21. She was having trouble seeing her grandchildren for months because of the children's other grandmother. Patient's father was having issues and her brother and sister were \"on a drunk streak\" and asking to borrow money. She was checking on her father twice daily (age 78) to help take care of him, having to get all of his things and do his shopping, and her daughter's grief from losing her brother.   · I discussed my recommendation for referral to psychology and psychiatry for treatment of her moods as well as adjustment reaction and grief reaction. She was referred to psychology but declines medication and psychiatry.     Abscess left buttock- doing ok without lesions  · 2020, she developed a redness " and soreness on her left buttock. The area increased in size to a large abscess. She then had spontaneous rupture and d/c and continued sitz baths, cleaning and keeping covered. The area healed with no erythema, edema, or abscess.   · She had some healing scar tissue. I counseled her on the risks of such severe infections. She should be seen if recurrence or if she has similar lesions in the future.   Thrombosed external hemorrhoid- no pain or swelling.   · Patient had spontaneous rupture and some continued bleeding from open, draining, thrombosed hemorrhoid without erythema, edema, or signs/ symptoms of infection. She continued Sitz baths.   · I advised continue Preparation H, current hygiene regimen, and to call or be seen if worsening, new, or changing symptoms. We will consider general surgery if no resolution of symptoms.     Possible postmenopausal bleeding- no further bleeding or menstrual concerns.   · She is uncertain if the bleeding is from her hemorrhoid or vaginal bleeding, however, with wiping, she thinks this could be vaginal bleeding. She has not had menses in several years.   · I discussed with her that she needs evaluation with GYN for possible postmenopausal bleeding but is also overdue for annual GYN exam and should complete this. I referred to GYN.   Vaginitis- no complaints today  · Patient has no itching or significant vaginal d/c but has a mild d/c with an odor.   · I gave Metrogel vaginal suppository at night x 5 nights. She was advised to discuss further with GYN when she had evaluation.     Right leg cramping, fatigue, and dizziness- no complaints today  · She reports this has only occurred with increased working and not being able to rest, eat and drink properly. Patient was advised to let me know if no resolution, worsening, new, or changing symptoms and I would consider further workup or treatment or a drug holiday from Lipitor for 2 weeks to see if we need to change medication.    The  following portions of the patient's history were reviewed and updated as appropriate: allergies, current medications, past family history, past medical history, past social history, past surgical history and problem list.    Review of Systems   Constitutional: Positive for fatigue.   HENT: Positive for ear pain.    Respiratory: Negative.    Cardiovascular: Negative.    Gastrointestinal: Positive for hemorrhoids. Negative for diarrhea and vomiting.   Musculoskeletal: Positive for arthralgias, back pain and myalgias.   Skin:        Area on right thigh that is cold   Neurological: Negative.    Psychiatric/Behavioral: Positive for dysphoric mood. The patient is nervous/anxious.        Objective    Vitals:    03/08/23 1139   BP: 106/78   Pulse: 74   Temp: 97.1 °F (36.2 °C)   SpO2: 98%     Body mass index is 23.58 kg/m².    Physical Exam   Constitutional: She is oriented to person, place, and time. She appears well-developed. No distress.   HENT:   Head: Normocephalic and atraumatic.   Right Ear: External ear normal.   Left Ear: External ear normal.   Nose: Nose normal.   Eyes: Conjunctivae and lids are normal.   Neck: Carotid bruit is not present.   Cardiovascular: Normal rate, regular rhythm, normal heart sounds and normal pulses. Exam reveals no gallop and no friction rub.   No murmur heard.  Pulmonary/Chest: Effort normal and breath sounds normal. No respiratory distress. She has no wheezes. She has no rhonchi. She has no rales.   Abdominal: Normal appearance.   Musculoskeletal: Normal range of motion. No deformity.      Lumbar back: She exhibits no tenderness, no bony tenderness, no swelling, no edema, no spasm and normal pulse.   Neurological: She is alert and oriented to person, place, and time. Gait normal.   Reflex Scores:       Patellar reflexes are 2+ on the right side and 2+ on the left side.       Achilles reflexes are 2+ on the right side and 2+ on the left side.  Skin: Skin is warm and dry.   Mild erythema  right lateral thigh   Psychiatric: Her speech is normal and behavior is normal. Judgment and thought content normal.   Nursing note and vitals reviewed.      Assessment & Plan   Diagnoses and all orders for this visit:    1. Parathyroid adenoma (Primary)  -     Ambulatory Referral to ENT (Otolaryngology)    2. Lymphadenopathy of head and neck  -     Ambulatory Referral to ENT (Otolaryngology)    3. Postablative hypothyroidism  -     Ambulatory Referral to ENT (Otolaryngology)  -     TSH  -     T4, free  -     T3, Free    4. Mediastinal abnormality  -     Ambulatory Referral to ENT (Otolaryngology)    5. Other hyperlipidemia  -     Comprehensive Metabolic Panel  -     CK  -     Lipid Panel With LDL / HDL Ratio    6. Prediabetes  -     Comprehensive Metabolic Panel  -     Hemoglobin A1c    7. Elevated fasting glucose  -     Comprehensive Metabolic Panel  -     Hemoglobin A1c    8. Vitamin D deficiency  -     Comprehensive Metabolic Panel  -     Vitamin D,25-Hydroxy    9. Hematuria, unspecified type  -     CBC & Differential  -     Urinalysis With Culture If Indicated -    Other orders  -     Microscopic Examination -         Assessment and plan  Patient will have fasting labs. Call if no results in 1 week. Stability of conditions, plan, follow up, and further recommendations pending labs. If stable, follow up in 3-6 months.      · Hypothyroidism- Continue levothyroxine 88 mcg daily. Dosage changes pending labs.    · Hyperlipidemia- Last lipids were more uncontrolled 89/2021. Continue Lipitor 40 mg at bedtime and Zetia 10 mg once daily. Further recommendations once labs are received.   · Elevated fasting glucose- Last A1C was in prediabetes range at 5.7 % 8/2021. I will continue to monitor.   · Vitamin D deficiency- Last vitamin D was low. Dosing recommendations pending labs. She will likely need to take vitamin D 50,000 IU daily as prescribed.   · Parathyroid adenoma- Patient has been counseled at length about the  need to follow up with ENT, compliance with their treatment recommendations, and the risks of not having testing. She will let me know when she is willing to see ENT.   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 She has not been willing to see psychiatry for medication. If she has uncontrolled moods, worsening moods, or if counseling is not able to control mood symptoms, she should see the psychiatrist that works with them for consideration of medication.   · Abscess left buttock- To be seen if recurrence or any other skin lesions.   · Thrombosed external hemorrhoid- Continue Preparation H, current hygiene regimen, and to call or be seen if worsening, new, or changing symptoms. We will consider general surgery if no resolution of symptoms.   · Possible postmenopausal bleeding- Follow up with GYN as directed by them.    · Vaginitis- Discuss further with GYN with evaluation if recurrence or no resolution.   · Right leg cramping, fatigue, and dizziness- I will consider further workup or treatment or a drug holiday from [facility] for 2 weeks to see if we need to change medication.  · Right lower extremity numbness versus cold sensation, edema, erythema-patient has noted a circumscribed area on her right, lateral thigh that feels cold and sore.  On exam, the area is a little more red than the left thigh, however, it is unclear if this is from rubbing or touching this area.  He has had some edema right thigh.  I will refer for right lower extremity duplex.  If negative for DVT, she should have LUIS DANIEL for circulation.  I will also cover with doxycycline 100 mg twice daily for 10 days in the event this could be related to cellulitis.  Patient to be seen ASAP if worsening, new or changing symptoms.  Otherwise, further recommendations pending testing.    I spent 35 minutes caring for Isacc Aldridge on this date of service. This time includes time spent by me in the  following activities: preparing for the visit, reviewing tests, specialists records, and previous visits, obtaining and/or reviewing a separately obtained history, performing a medically appropriate examination and/or evaluation, counseling and educating the patient/family/caregiver, referring and/or communicating with other health care professionals as necessary, documenting information in the medical record, independently interpreting results and communicating that information with the patient/family/caregiver, and developing a medically appropriate treatment plan with consideration of other conditions, medications, and treatments.

## 2023-03-09 LAB
25(OH)D3+25(OH)D2 SERPL-MCNC: 18.9 NG/ML (ref 30–100)
ALBUMIN SERPL-MCNC: 4.9 G/DL (ref 3.8–4.9)
ALBUMIN/GLOB SERPL: 2 {RATIO} (ref 1.2–2.2)
ALP SERPL-CCNC: 68 IU/L (ref 44–121)
ALT SERPL-CCNC: 22 IU/L (ref 0–32)
APPEARANCE UR: CLEAR
AST SERPL-CCNC: 20 IU/L (ref 0–40)
BACTERIA #/AREA URNS HPF: NORMAL /[HPF]
BASOPHILS # BLD AUTO: 0 X10E3/UL (ref 0–0.2)
BASOPHILS NFR BLD AUTO: 0 %
BILIRUB SERPL-MCNC: 0.8 MG/DL (ref 0–1.2)
BILIRUB UR QL STRIP: NEGATIVE
BUN SERPL-MCNC: 15 MG/DL (ref 6–24)
BUN/CREAT SERPL: 18 (ref 9–23)
CALCIUM SERPL-MCNC: 9.9 MG/DL (ref 8.7–10.2)
CASTS URNS QL MICRO: NORMAL /LPF
CHLORIDE SERPL-SCNC: 104 MMOL/L (ref 96–106)
CHOLEST SERPL-MCNC: 255 MG/DL (ref 100–199)
CK SERPL-CCNC: 76 U/L (ref 32–182)
CO2 SERPL-SCNC: 23 MMOL/L (ref 20–29)
COLOR UR: YELLOW
CREAT SERPL-MCNC: 0.84 MG/DL (ref 0.57–1)
EGFRCR SERPLBLD CKD-EPI 2021: 82 ML/MIN/1.73
EOSINOPHIL # BLD AUTO: 0.1 X10E3/UL (ref 0–0.4)
EOSINOPHIL NFR BLD AUTO: 1 %
EPI CELLS #/AREA URNS HPF: NORMAL /HPF (ref 0–10)
ERYTHROCYTE [DISTWIDTH] IN BLOOD BY AUTOMATED COUNT: 12.5 % (ref 11.7–15.4)
GLOBULIN SER CALC-MCNC: 2.4 G/DL (ref 1.5–4.5)
GLUCOSE SERPL-MCNC: 88 MG/DL (ref 70–99)
GLUCOSE UR QL STRIP: NEGATIVE
HBA1C MFR BLD: 5.7 % (ref 4.8–5.6)
HCT VFR BLD AUTO: 43.2 % (ref 34–46.6)
HDLC SERPL-MCNC: 69 MG/DL
HGB BLD-MCNC: 15 G/DL (ref 11.1–15.9)
HGB UR QL STRIP: NEGATIVE
IMM GRANULOCYTES # BLD AUTO: 0 X10E3/UL (ref 0–0.1)
IMM GRANULOCYTES NFR BLD AUTO: 0 %
KETONES UR QL STRIP: NEGATIVE
LDLC SERPL CALC-MCNC: 174 MG/DL (ref 0–99)
LDLC/HDLC SERPL: 2.5 RATIO (ref 0–3.2)
LEUKOCYTE ESTERASE UR QL STRIP: NEGATIVE
LYMPHOCYTES # BLD AUTO: 1.5 X10E3/UL (ref 0.7–3.1)
LYMPHOCYTES NFR BLD AUTO: 31 %
MCH RBC QN AUTO: 31.8 PG (ref 26.6–33)
MCHC RBC AUTO-ENTMCNC: 34.7 G/DL (ref 31.5–35.7)
MCV RBC AUTO: 92 FL (ref 79–97)
MICRO URNS: NORMAL
MICRO URNS: NORMAL
MONOCYTES # BLD AUTO: 0.3 X10E3/UL (ref 0.1–0.9)
MONOCYTES NFR BLD AUTO: 7 %
NEUTROPHILS # BLD AUTO: 3 X10E3/UL (ref 1.4–7)
NEUTROPHILS NFR BLD AUTO: 61 %
NITRITE UR QL STRIP: NEGATIVE
PH UR STRIP: 6 [PH] (ref 5–7.5)
PLATELET # BLD AUTO: 152 X10E3/UL (ref 150–450)
POTASSIUM SERPL-SCNC: 4.3 MMOL/L (ref 3.5–5.2)
PROT SERPL-MCNC: 7.3 G/DL (ref 6–8.5)
PROT UR QL STRIP: NEGATIVE
RBC # BLD AUTO: 4.71 X10E6/UL (ref 3.77–5.28)
RBC #/AREA URNS HPF: NORMAL /HPF (ref 0–2)
SODIUM SERPL-SCNC: 142 MMOL/L (ref 134–144)
SP GR UR STRIP: 1.01 (ref 1–1.03)
T3FREE SERPL-MCNC: 2.9 PG/ML (ref 2–4.4)
T4 FREE SERPL-MCNC: 1.8 NG/DL (ref 0.82–1.77)
TRIGL SERPL-MCNC: 75 MG/DL (ref 0–149)
TSH SERPL DL<=0.005 MIU/L-ACNC: 2.23 UIU/ML (ref 0.45–4.5)
URINALYSIS REFLEX: NORMAL
UROBILINOGEN UR STRIP-MCNC: 0.2 MG/DL (ref 0.2–1)
VLDLC SERPL CALC-MCNC: 12 MG/DL (ref 5–40)
WBC # BLD AUTO: 5 X10E3/UL (ref 3.4–10.8)
WBC #/AREA URNS HPF: NORMAL /HPF (ref 0–5)

## 2023-03-11 DIAGNOSIS — E78.49 OTHER HYPERLIPIDEMIA: ICD-10-CM

## 2023-03-13 RX ORDER — ATORVASTATIN CALCIUM 40 MG/1
TABLET, FILM COATED ORAL
Qty: 90 TABLET | Refills: 0 | Status: SHIPPED | OUTPATIENT
Start: 2023-03-13 | End: 2023-03-17 | Stop reason: SDUPTHER

## 2023-03-15 ENCOUNTER — TELEPHONE (OUTPATIENT)
Dept: FAMILY MEDICINE CLINIC | Facility: CLINIC | Age: 58
End: 2023-03-15

## 2023-03-15 DIAGNOSIS — E89.0 POSTABLATIVE HYPOTHYROIDISM: ICD-10-CM

## 2023-03-15 RX ORDER — ERGOCALCIFEROL 1.25 MG/1
50000 CAPSULE ORAL WEEKLY
Qty: 13 CAPSULE | Refills: 0 | Status: SHIPPED | OUTPATIENT
Start: 2023-03-15

## 2023-03-15 RX ORDER — LEVOTHYROXINE SODIUM 0.1 MG/1
100 TABLET ORAL DAILY
Qty: 90 TABLET | Refills: 0 | Status: CANCELLED | OUTPATIENT
Start: 2023-03-15

## 2023-03-15 NOTE — TELEPHONE ENCOUNTER
Caller: Isacc Aldridge    Relationship: Self    Best call back number: [phone]    Caller requesting test results: PATIENT    What test was performed: BLOOD WORK     When was the test performed: 3/8/23    Where was the test performed: IN OFFICE     Additional notes:

## 2023-03-16 ENCOUNTER — TELEPHONE (OUTPATIENT)
Dept: PEDIATRICS | Facility: OTHER | Age: 58
End: 2023-03-16

## 2023-03-16 ENCOUNTER — TELEPHONE (OUTPATIENT)
Dept: FAMILY MEDICINE CLINIC | Facility: CLINIC | Age: 58
End: 2023-03-16

## 2023-03-16 DIAGNOSIS — E89.0 POSTABLATIVE HYPOTHYROIDISM: ICD-10-CM

## 2023-03-16 RX ORDER — LEVOTHYROXINE SODIUM 0.1 MG/1
100 TABLET ORAL DAILY
Qty: 90 TABLET | Refills: 0 | Status: CANCELLED | OUTPATIENT
Start: 2023-03-16

## 2023-03-16 NOTE — TELEPHONE ENCOUNTER
Caller: Isacc Aldridge    Relationship to patient: Self    Best call back number: [phone]     Patient is needing: PATIENT IS CALLING TO CHECK THE STATUS OF HER REQUEST FOR A REFILL FOR THE FOLLOWING MEDICATION.  SHE ALSO STATES SHE HAS 2 NEW MEDICATIONS AND DOES  NOT KNOW WHAT THEY ARE FOR.  SHE WOULD LIKE A RETURN CALL.    UNABLE TO WARM TRANSFER.     levothyroxine (SYNTHROID, LEVOTHROID) 100 MCG tablet       [facility] [id number] - [phone]  - [phone]   [phone]

## 2023-03-16 NOTE — TELEPHONE ENCOUNTER
Caller: Isacc Aldridge    Relationship: Self    Best call back number: [phone]     What is the best time to reach you: ANYTIME    Who are you requesting to speak with (clinical staff, provider,  specific staff member): CLINICAL    Do you know the name of the person who called: TOMI    What was the call regarding: PATIENT RETURNING CALL FOR TEST RESULTS     Do you require a callback: YES

## 2023-03-17 DIAGNOSIS — E78.49 OTHER HYPERLIPIDEMIA: ICD-10-CM

## 2023-03-17 RX ORDER — LEVOTHYROXINE SODIUM 0.1 MG/1
100 TABLET ORAL DAILY
Qty: 90 TABLET | Refills: 0 | Status: SHIPPED | OUTPATIENT
Start: 2023-03-17

## 2023-03-17 RX ORDER — ATORVASTATIN CALCIUM 80 MG/1
80 TABLET, FILM COATED ORAL NIGHTLY
Qty: 90 TABLET | Refills: 0 | Status: SHIPPED | OUTPATIENT
Start: 2023-03-17

## 2023-03-20 ENCOUNTER — TELEPHONE (OUTPATIENT)
Dept: FAMILY MEDICINE CLINIC | Facility: CLINIC | Age: 58
End: 2023-03-20

## 2023-03-20 ENCOUNTER — TELEPHONE (OUTPATIENT)
Dept: FAMILY MEDICINE CLINIC | Facility: CLINIC | Age: 58
End: 2023-03-20
Payer: COMMERCIAL

## 2023-03-20 NOTE — TELEPHONE ENCOUNTER
Caller: Isacc Aldridge    Relationship: Self    Best call back number: [phone]    What is the best time to reach you: ANYTIME    Who are you requesting to speak with (clinical staff, provider,  specific staff member): HEBERT    Do you know the name of the person who called: HEBERT    Do you require a callback: YES

## 2023-03-20 NOTE — TELEPHONE ENCOUNTER
----- Message from GAUTAM Blue sent at 3/17/2023  6:42 PM EDT -----  She should increase Lipitor from 40 mg to 80 mg daily and continue Zetia 10 mg daily.   I will see her in 3 months, fasting.

## 2023-03-20 NOTE — TELEPHONE ENCOUNTER
Left message to call    Ok to relay    She should increase Lipitor from 40 mg to 80 mg daily and continue Zetia 10 mg daily.   I will see her in 3 months, fasting.

## 2023-04-11 ENCOUNTER — TELEPHONE (OUTPATIENT)
Dept: FAMILY MEDICINE CLINIC | Facility: CLINIC | Age: 58
End: 2023-04-11
Payer: COMMERCIAL

## 2023-04-11 NOTE — TELEPHONE ENCOUNTER
Caller: Isacc Aldridge    Relationship: Self    Best call back number:[phone]    Do you require a callback: YES-PATIENT STATES SHE HAD A MESSAGE REGARDING HER REFERRAL AND THAT SHE COULDN'T HAVE REFERRAL APPT UNTIL SHE GOT HER NEW INSURANCE CARD BUT SHE STATES SHE DOESN'T HAVE A NEW CARD. SHE WOULD LIKE TO SPEAK TO A NURSE. PLEASE ADVISE.

## 2023-04-17 NOTE — TELEPHONE ENCOUNTER
PATIENT RETURNED CALL FROM [place].  CEM STATED THAT SHE HASN'T GOTTEN A CALL ABOUT THE REFERRAL AND WOULD LIKE TO GET MORE INFORMATION ABOUT IT.    PATIENT WAS TOLD TO CALL HER INSURANCE BUT SHE DOESN'T KNOW WHY  SHE NEEDS TO CALL.    PLEASE ADVISE    [phone]      PATIENT IS HOME AND ABLE TO TAKE CALL.

## 2023-04-25 NOTE — TELEPHONE ENCOUNTER
LVMTCB BECAUSE OUR OFFICE ALSO NEEDS A COPY OF HER INSURANCE CARD BECAUSE WE DO NOT HAVE IT ON FILE     I CALLED PT TO EXPLAIN TO HER WHAT I AM IN NEED OF, IF HER PASSPORT CARD DOES NOT HAVE [employer]S NAME ON IT SHE WILL HAVE TO CALL [employer] AND REQUEST A NEW CARD FROM THEM WITH [employer] NAME ON IT.

## 2023-05-17 ENCOUNTER — TELEPHONE (OUTPATIENT)
Dept: FAMILY MEDICINE CLINIC | Facility: CLINIC | Age: 58
End: 2023-05-17

## 2023-05-17 NOTE — TELEPHONE ENCOUNTER
Caller: Isacc Aldridge    Relationship: Self    Best call back number: [phone]     What is the best time to reach you:ANYTIME    Who are you requesting to speak with (clinical staff, provider,  specific staff member): CLINICAL    What was the call regarding: PATIENT CALLING TO LET [employer] KNOW SHE RECEIVED HER KNEW INSURANCE CARD IT NOW HAS HER NAME LISTED AS THE PCP SHE WOULD LIKE TO KNOW IF THE REFERRAL COULD BE PROCESSED AGAIN    Do you require a callback: YES

## 2023-05-18 NOTE — TELEPHONE ENCOUNTER
PATIENT CALLED BACK, SHARED INFO WITH PATIENT. SHE STATES SHE IS WANTING TO SEE A NECK SPECIALIST IN [place], BY [facility], [facility] [employer]( USE TO BE THERE)  THIS HAS BEEN GOING ON FOR AWHILE. SHE WAS ASKED TO GET [employer] NAME ON HER INSURANCE CARD SO SHE COULD DO THE REFERRAL. SHE NOW HAS [employer]S NAME ON HER CARD    SHE STATES [employer] SHOULD KNOW ABOUT THE REFERRAL FROM LAST VISIT.    CALL BACK NUMBER [phone] SHE IS USUALLY OFF WORK AT 3:30 AND THAT'S THE BEST TIME TO CALL

## 2023-05-18 NOTE — TELEPHONE ENCOUNTER
Tried to call patient back but she did not answer. LMTRC - KL       Lauryn wants to know more about the referral she is wanting reprocessed. Where is the Referral to? What are you needing the referral for?     HUB OKAY TO READ - KL

## 2023-06-05 RX ORDER — ERGOCALCIFEROL 1.25 MG/1
CAPSULE ORAL
Qty: 13 CAPSULE | Refills: 0 | Status: SHIPPED | OUTPATIENT
Start: 2023-06-05

## 2023-06-08 DIAGNOSIS — E78.49 OTHER HYPERLIPIDEMIA: ICD-10-CM

## 2023-06-08 NOTE — TELEPHONE ENCOUNTER
Per results note 3/2023- patient was to be scheduled for follow up with me in 3 months. Please schedule her for follow up with me, fasting.

## 2023-06-09 RX ORDER — ATORVASTATIN CALCIUM 80 MG/1
80 TABLET, FILM COATED ORAL NIGHTLY
Qty: 90 TABLET | Refills: 0 | Status: SHIPPED | OUTPATIENT
Start: 2023-06-09

## 2023-06-09 RX ORDER — ATORVASTATIN CALCIUM 40 MG/1
TABLET, FILM COATED ORAL
Qty: 90 TABLET | OUTPATIENT
Start: 2023-06-09

## 2023-06-15 DIAGNOSIS — E78.49 OTHER HYPERLIPIDEMIA: ICD-10-CM

## 2023-06-15 RX ORDER — ATORVASTATIN CALCIUM 80 MG/1
TABLET, FILM COATED ORAL
Qty: 90 TABLET | Refills: 0 | OUTPATIENT
Start: 2023-06-15

## 2023-06-19 DIAGNOSIS — E89.0 POSTABLATIVE HYPOTHYROIDISM: ICD-10-CM

## 2023-06-19 RX ORDER — LEVOTHYROXINE SODIUM 0.1 MG/1
100 TABLET ORAL DAILY
Qty: 90 TABLET | Refills: 0 | Status: SHIPPED | OUTPATIENT
Start: 2023-06-19

## 2023-06-19 NOTE — TELEPHONE ENCOUNTER
Caller: Isacc Aldridge    Relationship: Self    Best call back number: [phone]    Requested Prescriptions:   Requested Prescriptions     Pending Prescriptions Disp Refills    levothyroxine (SYNTHROID, LEVOTHROID) 100 MCG tablet 90 tablet 0     Sig: Take 1 tablet by mouth Daily.        Pharmacy where request should be sent: [facility] [id number] [facility] [address] - [phone]  - [phone] FX     Last office visit with prescribing clinician: 3/8/2023   Last telemedicine visit with prescribing clinician: Visit date not found   Next office visit with prescribing clinician: 6/23/2023     Additional details provided by patient:OUT OF MEDICATION     Does the patient have less than a 3 day supply:  [x] Yes  [] No    Would you like a call back once the refill request has been completed: [] Yes [] No    If the office needs to give you a call back, can they leave a voicemail: [] Yes [] No    Nayla Chapin   06/19/23 10:55 EDT

## 2023-07-03 ENCOUNTER — TELEPHONE (OUTPATIENT)
Dept: FAMILY MEDICINE CLINIC | Facility: CLINIC | Age: 58
End: 2023-07-03

## 2023-07-03 NOTE — TELEPHONE ENCOUNTER
Caller: Isacc Aldridge    Relationship: Self    Best call back number: [phone]    What test was performed: LABS    When was the test performed: 06/23/2023     Where was the test performed: IN OFFICE     Additional notes: PATIENT STATES SHE IS WANTING TO KNOW IF HER LAB RESULTS ARE BACK YET.     PATIENT STATES SHE HAS A NEW PHONE NUMBER AND IT HAS BEEN UPDATED ON HER CHART.     PATIENT IS REQUESTING A CALL BACK WITH THE RESULTS.

## 2023-07-03 NOTE — TELEPHONE ENCOUNTER
Caller: Isacc Aldridge    Relationship: Self    Best call back number: [phone]    What was the call regarding: PATIENT STATES SHE NO LONGER USES THE [facility] AND SHE HAS SWITCHED TO [facility].

## 2023-07-13 NOTE — TELEPHONE ENCOUNTER
RECEIVED FAX FROM [facility] STATING THEY ARE UNABLE TO SCHEDULE  PT AT THIS TIME DUE PROVIDER FROM OUR OFFICE NOT MATCHING WITH PATIENTS INSURANCE CARD    PCP NOT MATCHING

## 2023-07-21 ENCOUNTER — TELEPHONE (OUTPATIENT)
Dept: FAMILY MEDICINE CLINIC | Facility: CLINIC | Age: 58
End: 2023-07-21

## 2023-07-21 NOTE — TELEPHONE ENCOUNTER
Caller: Isacc Aldridge    Relationship: Self    Best call back number: [phone]     What is the best time to reach you: ANYTIME    Who are you requesting to speak with (clinical staff, provider,  specific staff member):CLINICAL     What was the call regarding: PATIENT CALLING STATING THAT HER FOOT WAS RAN OVER IT IS SWOLLEN REALLY BAD AND BRUISED SHE WAS SEEN AT A QUICK CARE AT HER WORK SHE STATED SHE THAT THEY TOLD HER SHE HAS A FRACTURED SHE IS UNABLE TO GET UP VERY GOOD AND IN A LOT OF PAIN.    PATIENT HAS BEEN ELEVATING HER LEG AND PUTTING COLD COMPRESS ON IT SHE IS UNABLE TO MOVE HER TOES SHE HAS BEEN TAKING IBUPROFEN  IT DOESN'T SEEM TO BE HELPING WITH THE PAIN     Is it okay if the provider responds through MyChart:          Clear bilaterally, pupils equal, round and reactive to light.

## 2023-07-21 NOTE — TELEPHONE ENCOUNTER
Left message to call OK FOR HUB TO RELAY    We do not have a provider in the office today. Patient needs to go to UC or ER to be evaluated and be given a boot or cast

## 2023-08-14 ENCOUNTER — OFFICE VISIT (OUTPATIENT)
Dept: FAMILY MEDICINE CLINIC | Facility: CLINIC | Age: 58
End: 2023-08-14
Payer: COMMERCIAL

## 2023-08-14 VITALS
DIASTOLIC BLOOD PRESSURE: 74 MMHG | SYSTOLIC BLOOD PRESSURE: 132 MMHG | HEIGHT: 66 IN | HEART RATE: 86 BPM | BODY MASS INDEX: 24.11 KG/M2 | WEIGHT: 150 LBS | RESPIRATION RATE: 18 BRPM | TEMPERATURE: 98.9 F | OXYGEN SATURATION: 99 %

## 2023-08-14 DIAGNOSIS — B02.9 HERPES ZOSTER WITHOUT COMPLICATION: Primary | ICD-10-CM

## 2023-08-14 PROCEDURE — 99213 OFFICE O/P EST LOW 20 MIN: CPT | Performed by: PHYSICIAN ASSISTANT

## 2023-08-14 PROCEDURE — 1159F MED LIST DOCD IN RCRD: CPT | Performed by: PHYSICIAN ASSISTANT

## 2023-08-14 PROCEDURE — 1160F RVW MEDS BY RX/DR IN RCRD: CPT | Performed by: PHYSICIAN ASSISTANT

## 2023-08-14 RX ORDER — VALACYCLOVIR HYDROCHLORIDE 1 G/1
1000 TABLET, FILM COATED ORAL 3 TIMES DAILY
Qty: 30 TABLET | Refills: 0 | Status: SHIPPED | OUTPATIENT
Start: 2023-08-14

## 2023-08-14 RX ORDER — METHYLPREDNISOLONE 4 MG/1
TABLET ORAL
Qty: 21 TABLET | Refills: 0 | Status: SHIPPED | OUTPATIENT
Start: 2023-08-14

## 2023-08-14 RX ORDER — LIDOCAINE 50 MG/G
1 OINTMENT TOPICAL 4 TIMES DAILY
Qty: 50 G | Refills: 0 | Status: SHIPPED | OUTPATIENT
Start: 2023-08-14

## 2023-08-14 NOTE — LETTER
August 14, 2023     Patient: Isacc Aldridge   YOB: 1965   Date of Visit: 8/14/2023       To Whom It May Concern:    It is my medical opinion that Isacc Aldridge may return to light duty immediately with the following restrictions: unable to lift, twist, bend, stoop, or walk excessively .           Sincerely,        GAUTAM Blue    CC: No Recipients

## 2023-08-14 NOTE — PROGRESS NOTES
Subjective   Isacc Aldridge is a 58 y.o. female who presents today for evaluation of pain in left ribs.     History of Present Illness     She reports she started with pain in left side pain 2 weeks ago.   She reports she could not sleep. She cannot get comfortable.   She was freezing at home and freezing in the store. She got up yesterday and noted rash. She cannot wear a bra and hurts. Dressing self in bed and undresses in bed. For a week, she crawled on buttock to get around.     She is on Workman's comp but she went back to work 2 weeks ago to be able to get a paycheck. He put her back to 50/50- to sit 50% and work 50%. She has limited movement of her toes and is walking on side of her foot. When she went to work, she was put back to work.     The following portions of the patient's history were reviewed and updated as appropriate: allergies, current medications, past family history, past medical history, past social history, past surgical history and problem list.    Review of Systems    Objective   Vitals:    08/14/23 1420   BP: 132/74   Pulse: 86   Resp: 18   Temp: 98.9 øF (37.2 øC)   SpO2: 99%     Body mass index is 24.22 kg/mý.    Physical Exam  Constitutional:       General: She is not in acute distress.     Appearance: She is well-developed.   HENT:      Head: Normocephalic and atraumatic.   Eyes:      General:         Right eye: No discharge.         Left eye: No discharge.   Pulmonary:      Effort: Pulmonary effort is normal. No respiratory distress.   Skin:     General: Skin is dry.      Comments: Patches of raised lesions left chest wall   Psychiatric:         Attention and Perception: She is attentive.         Speech: Speech normal.         Behavior: Behavior normal.       Assessment & Plan   Diagnoses and all orders for this visit:    1. Herpes zoster without complication (Primary)  -     valACYclovir (Valtrex) 1000 MG tablet; Take 1 tablet by mouth 3 (Three) Times a Day.  Dispense: 30 tablet;  Refill: 0  -     methylPREDNISolone (Medrol) 4 MG dose pack; follow package directions  Dispense: 21 tablet; Refill: 0  -     lidocaine (XYLOCAINE) 5 % ointment; Apply 1 application  topically to the appropriate area as directed 4 (Four) Times a Day.  Dispense: 50 g; Refill: 0        Assessment and Plan  Patient with significant pain in the left ribs that is consistent with herpes zoster.  I will give Valtrex 1 g times daily, Medrol Dosepak, and will give lidocaine as needed.  Patient to call or return ASAP if she does not have improvement in pain or has worsening, new or changing symptoms.    I spent 20 minutes caring for Isacc Aldridge on this date of service. This time includes time spent by me in the following activities as necessary: preparing for the visit, reviewing tests, specialists records and previous visits, obtaining and/or reviewing a separately obtained history, performing a medically appropriate exam and/or evaluation, counseling and educating the patient, family, caregiver, referring and/or communicating with other healthcare professionals, documenting information in the medical record, independently interpreting results and communicating that information with the patient, family, caregiver, and developing a medically appropriate treatment plan with consideration of other conditions, medications, and treatments.

## 2024-10-03 ENCOUNTER — OFFICE VISIT (OUTPATIENT)
Dept: OBSTETRICS AND GYNECOLOGY | Age: 59
End: 2024-10-03
Payer: COMMERCIAL

## 2024-10-03 VITALS
WEIGHT: 158 LBS | SYSTOLIC BLOOD PRESSURE: 110 MMHG | BODY MASS INDEX: 25.39 KG/M2 | DIASTOLIC BLOOD PRESSURE: 70 MMHG | HEIGHT: 66 IN

## 2024-10-03 DIAGNOSIS — F17.200 TOBACCO DEPENDENCE: ICD-10-CM

## 2024-10-03 DIAGNOSIS — Z01.419 ENCOUNTER FOR GYNECOLOGICAL EXAMINATION WITHOUT ABNORMAL FINDING: Primary | ICD-10-CM

## 2024-10-03 DIAGNOSIS — Z12.4 SCREENING FOR MALIGNANT NEOPLASM OF CERVIX: ICD-10-CM

## 2024-10-03 RX ORDER — ALBUTEROL SULFATE 90 UG/1
2 INHALANT RESPIRATORY (INHALATION) EVERY 6 HOURS PRN
COMMUNITY
Start: 2024-09-11 | End: 2025-09-11

## 2024-10-03 RX ORDER — BUPROPION HYDROCHLORIDE 150 MG/1
150 TABLET ORAL EVERY MORNING
COMMUNITY
Start: 2024-09-11 | End: 2024-10-18

## 2024-10-03 NOTE — PROGRESS NOTES
[facility]  Obstetrics and Gynecology   Routine Annual Visit    10/3/2024    Patient: Isacc Aldridge          MR#:[id number]    History of Present Illness    Chief Complaint   Patient presents with    Gynecologic Exam     New Gyn, Annual today, last annual years ago, last mammo 18(-) DEXA 18, Pt has no complaints today.     59 y.o. female  who presents for annual exam.    Patient has no concerns today.  She reports she went through menopause about 20 years ago.  She states she had a thyroid issue and was given radioactive iodine and her period stopped after that.  She does not have any menopausal symptoms of concern today.    Mammogram: scheduled 10/28/24, last  BIRADS 2  Cologard at home - planning to complete, ordered by PCP Dr Figueroa  Declines DEXA -  normal    Obstetric History:  OB History          3    Para   3    Term   3            AB        Living   1         SAB        IAB        Ectopic        Molar        Multiple        Live Births   2               Menstrual History:     No LMP recorded. Patient is postmenopausal.       Sexual History:   Not currently sexually active    Concern for IPV: denies  Dyspareunia: denies  Breast concerns: denies  Urinary incontinence: denies  Fecal/gas incontinence: denies  Concern for STI?: denies    Social History:   Works as , does odd jobs such as driving people to the [employer]  ________________________________________  Patient Active Problem List   Diagnosis    Postablative hypothyroidism    Vitamin D deficiency    Hyperlipidemia, unspecified    Parathyroid adenoma    Body mass index (BMI) of 22.0-22.9 in adult    Generalized anxiety disorder    Lymphadenopathy of head and neck region    Major depression, single episode    Routine health maintenance    Tobacco dependence     Past Medical History:   Diagnosis Date    Disease of thyroid gland     Hyperlipidemia      History reviewed. No pertinent surgical  history.  Social History     Tobacco Use   Smoking Status Every Day    Current packs/day: 1.50    Average packs/day: 1.5 packs/day for 34.0 years (51.0 ttl pk-yrs)    Types: Cigarettes   Smokeless Tobacco Never     Family History   Problem Relation Age of Onset    Heart disease Mother     Hypertension Mother     Lung disease Mother     Ovarian cancer Sister 54    Depression Son     Diabetes Paternal Aunt     Diabetes Paternal Uncle     Breast cancer Neg Hx     Uterine cancer Neg Hx     Colon cancer Neg Hx     Pancreatic cancer Neg Hx     Prostate cancer Neg Hx      Prior to Admission medications    Medication Sig Start Date End Date Taking? Authorizing Provider   albuterol sulfate  (90 Base) MCG/ACT inhaler Inhale 2 puffs Every 6 (Six) Hours As Needed. 9/11/24 9/11/25 Yes Verona Meyer MD   atorvastatin (LIPITOR) 80 MG tablet Take 1 tablet by mouth Every Night. 6/9/23  Yes Lauryn Escalante PA   buPROPion XL (WELLBUTRIN XL) 150 MG 24 hr tablet Take 1 tablet by mouth Every Morning. 9/11/24 10/18/24 Yes Verona Meyer MD   ezetimibe (ZETIA) 10 MG tablet TAKE ONE TABLET BY MOUTH DAILY 12/22/22  Yes Lauryn Escalante PA   ibuprofen (ADVIL,MOTRIN) 200 MG tablet Take 1 tablet by mouth Every 6 (Six) Hours As Needed for Mild Pain.   Yes Verona Meyer MD   levothyroxine (SYNTHROID, LEVOTHROID) 100 MCG tablet Take 1 tablet by mouth Daily. 6/19/23  Yes Lauryn Escalante PA   lidocaine (XYLOCAINE) 5 % ointment Apply 1 application  topically to the appropriate area as directed 4 (Four) Times a Day.  Patient not taking: Reported on 10/3/2024 8/14/23   Lauryn Escalante PA   methylPREDNISolone (Medrol) 4 MG dose pack follow package directions  Patient not taking: Reported on 10/3/2024 8/14/23   Lauryn Escalante PA   valACYclovir (Valtrex) 1000 MG tablet Take 1 tablet by mouth 3 (Three) Times a Day.  Patient not taking: Reported on 10/3/2024 1/28/22   Lauryn Escalante PA   valACYclovir (Valtrex) 1000 MG  "tablet Take 1 tablet by mouth 3 (Three) Times a Day.  Patient not taking: Reported on 10/3/2024 8/14/23   Lauryn Escalante PA   vitamin D (ERGOCALCIFEROL) 1.25 MG (95029 UT) capsule capsule TAKE ONE CAPSULE BY MOUTH ONCE WEEKLY  Patient not taking: Reported on 10/3/2024 6/5/23   Lauryn Escalante PA     ________________________________________    Current contraception: post menopausal status  History of abnormal Pap smear: yes - long time ago has had colposcopies  History of abnormal mammogram: yes - remote , last normal 2018    Review of Systems   Constitutional:  Negative for chills and fever.   Gastrointestinal:  Negative for abdominal pain, constipation, diarrhea, nausea and vomiting.   Genitourinary:  Negative for dysuria, frequency and urgency.            Objective     /70   Ht 167.6 cm (65.98\")   Wt 71.7 kg (158 lb)   BMI 25.51 kg/m²    BP Readings from Last 3 Encounters:   10/03/24 110/70   08/14/23 132/74   06/23/23 118/68      Wt Readings from Last 3 Encounters:   10/03/24 71.7 kg (158 lb)   08/14/23 68 kg (150 lb)   06/23/23 67.9 kg (149 lb 9.6 oz)        BMI: Estimated body mass index is 25.51 kg/m² as calculated from the following:    Height as of this encounter: 167.6 cm (65.98\").    Weight as of this encounter: 71.7 kg (158 lb).    Physical Exam  Vitals and nursing note reviewed.   Constitutional:       General: She is not in acute distress.     Appearance: Normal appearance.   HENT:      Head: Normocephalic and atraumatic.   Eyes:      Extraocular Movements: Extraocular movements intact.   Cardiovascular:      Rate and Rhythm: Normal rate and regular rhythm.   Pulmonary:      Effort: Pulmonary effort is normal. No respiratory distress.   Chest:   Breasts:     Right: Normal. No mass, nipple discharge, skin change or tenderness.      Left: Normal. No mass, nipple discharge, skin change or tenderness.   Abdominal:      General: There is no distension.      Palpations: Abdomen is soft. There is " no mass.      Tenderness: There is no abdominal tenderness.   Genitourinary:     Labia:         Right: No rash, lesion or injury.         Left: No rash, lesion or injury.       Vagina: Normal.      Cervix: Normal.      Uterus: Normal.       Adnexa: Right adnexa normal and left adnexa normal.   Musculoskeletal:         General: No swelling. Normal range of motion.      Cervical back: Normal range of motion.   Lymphadenopathy:      Upper Body:      Right upper body: No supraclavicular or axillary adenopathy.      Left upper body: No supraclavicular or axillary adenopathy.   Skin:     General: Skin is warm and dry.   Neurological:      General: No focal deficit present.      Mental Status: She is alert and oriented to person, place, and time.   Psychiatric:         Mood and Affect: Mood normal.         Behavior: Behavior normal.       Assessment:  Isacc Aldridge is a 59 y.o.  presenting for well woman exam.    Diagnoses and all orders for this visit:    1. Encounter for gynecological examination without abnormal finding (Primary)  -     IGP, Apt HPV,rfx 16 / 18,45    2. Screening for malignant neoplasm of cervix    3. Tobacco dependence  Overview:  Formatting of this note might be different from the original.  Discussed risk of Lung Cancer, COPD, PAD, stroke, heart attack, and death.  Discussed Smoking Cessation resources on [employer] website.  Encouraged to quit. 800-QUIT-NOW phone number discussed.       Last Assessment & Plan:   Formatting of this note might be different from the original.  Condition: stable    Follow up in: three months    Assessment & Plan:  Continues to smoke at least 1 pack/day.  Not ready to quit.  Cessation counseling provided.          Recommended flu/COVID vaccination.    Plan:  Return in about 1 year (around 10/3/2025) for [employer].    Sonya Rowe MD  10/3/2024 10:11 EDT

## 2024-10-09 LAB
CYTOLOGIST CVX/VAG CYTO: NORMAL
CYTOLOGY CVX/VAG DOC CYTO: NORMAL
CYTOLOGY CVX/VAG DOC THIN PREP: NORMAL
DX ICD CODE: NORMAL
HPV I/H RISK 4 DNA CVX QL PROBE+SIG AMP: NEGATIVE
Lab: NORMAL
OTHER STN SPEC: NORMAL
STAT OF ADQ CVX/VAG CYTO-IMP: NORMAL